# Patient Record
Sex: FEMALE | Race: WHITE | NOT HISPANIC OR LATINO | Employment: FULL TIME | URBAN - METROPOLITAN AREA
[De-identification: names, ages, dates, MRNs, and addresses within clinical notes are randomized per-mention and may not be internally consistent; named-entity substitution may affect disease eponyms.]

---

## 2017-05-16 ENCOUNTER — GENERIC CONVERSION - ENCOUNTER (OUTPATIENT)
Dept: OTHER | Facility: OTHER | Age: 22
End: 2017-05-16

## 2017-05-18 ENCOUNTER — ALLSCRIPTS OFFICE VISIT (OUTPATIENT)
Dept: OTHER | Facility: OTHER | Age: 22
End: 2017-05-18

## 2017-05-18 DIAGNOSIS — R53.83 OTHER FATIGUE: ICD-10-CM

## 2017-05-18 DIAGNOSIS — F41.9 ANXIETY DISORDER: ICD-10-CM

## 2017-05-18 DIAGNOSIS — F32.9 MAJOR DEPRESSIVE DISORDER, SINGLE EPISODE: ICD-10-CM

## 2017-06-16 ENCOUNTER — GENERIC CONVERSION - ENCOUNTER (OUTPATIENT)
Dept: OTHER | Facility: OTHER | Age: 22
End: 2017-06-16

## 2017-06-16 ENCOUNTER — ALLSCRIPTS OFFICE VISIT (OUTPATIENT)
Dept: OTHER | Facility: OTHER | Age: 22
End: 2017-06-16

## 2017-07-28 ENCOUNTER — APPOINTMENT (OUTPATIENT)
Dept: LAB | Facility: MEDICAL CENTER | Age: 22
End: 2017-07-28
Payer: COMMERCIAL

## 2017-07-28 DIAGNOSIS — R53.83 OTHER FATIGUE: ICD-10-CM

## 2017-07-28 DIAGNOSIS — F32.9 MAJOR DEPRESSIVE DISORDER, SINGLE EPISODE: ICD-10-CM

## 2017-07-28 DIAGNOSIS — F41.9 ANXIETY DISORDER: ICD-10-CM

## 2017-07-28 LAB
ALBUMIN SERPL BCP-MCNC: 3.9 G/DL (ref 3.5–5)
ALP SERPL-CCNC: 73 U/L (ref 46–116)
ALT SERPL W P-5'-P-CCNC: 16 U/L (ref 12–78)
ANION GAP SERPL CALCULATED.3IONS-SCNC: 8 MMOL/L (ref 4–13)
AST SERPL W P-5'-P-CCNC: 13 U/L (ref 5–45)
BASOPHILS # BLD AUTO: 0.01 THOUSANDS/ΜL (ref 0–0.1)
BASOPHILS NFR BLD AUTO: 0 % (ref 0–1)
BILIRUB SERPL-MCNC: 0.5 MG/DL (ref 0.2–1)
BUN SERPL-MCNC: 6 MG/DL (ref 5–25)
CALCIUM SERPL-MCNC: 9 MG/DL (ref 8.3–10.1)
CHLORIDE SERPL-SCNC: 107 MMOL/L (ref 100–108)
CO2 SERPL-SCNC: 25 MMOL/L (ref 21–32)
CREAT SERPL-MCNC: 0.66 MG/DL (ref 0.6–1.3)
EOSINOPHIL # BLD AUTO: 0 THOUSAND/ΜL (ref 0–0.61)
EOSINOPHIL NFR BLD AUTO: 0 % (ref 0–6)
ERYTHROCYTE [DISTWIDTH] IN BLOOD BY AUTOMATED COUNT: 14.6 % (ref 11.6–15.1)
GFR SERPL CREATININE-BSD FRML MDRD: 128 ML/MIN/1.73SQ M
GLUCOSE SERPL-MCNC: 83 MG/DL (ref 65–140)
HCT VFR BLD AUTO: 35.3 % (ref 34.8–46.1)
HGB BLD-MCNC: 11.9 G/DL (ref 11.5–15.4)
LYMPHOCYTES # BLD AUTO: 1.94 THOUSANDS/ΜL (ref 0.6–4.47)
LYMPHOCYTES NFR BLD AUTO: 21 % (ref 14–44)
MCH RBC QN AUTO: 27.1 PG (ref 26.8–34.3)
MCHC RBC AUTO-ENTMCNC: 33.7 G/DL (ref 31.4–37.4)
MCV RBC AUTO: 80 FL (ref 82–98)
MONOCYTES # BLD AUTO: 0.69 THOUSAND/ΜL (ref 0.17–1.22)
MONOCYTES NFR BLD AUTO: 8 % (ref 4–12)
NEUTROPHILS # BLD AUTO: 6.52 THOUSANDS/ΜL (ref 1.85–7.62)
NEUTS SEG NFR BLD AUTO: 71 % (ref 43–75)
NRBC BLD AUTO-RTO: 0 /100 WBCS
PLATELET # BLD AUTO: 268 THOUSANDS/UL (ref 149–390)
PMV BLD AUTO: 10.8 FL (ref 8.9–12.7)
POTASSIUM SERPL-SCNC: 3.8 MMOL/L (ref 3.5–5.3)
PROT SERPL-MCNC: 7.2 G/DL (ref 6.4–8.2)
RBC # BLD AUTO: 4.39 MILLION/UL (ref 3.81–5.12)
SODIUM SERPL-SCNC: 140 MMOL/L (ref 136–145)
TSH SERPL DL<=0.05 MIU/L-ACNC: 2.26 UIU/ML (ref 0.46–3.98)
WBC # BLD AUTO: 9.17 THOUSAND/UL (ref 4.31–10.16)

## 2017-07-28 PROCEDURE — 85025 COMPLETE CBC W/AUTO DIFF WBC: CPT

## 2017-07-28 PROCEDURE — 36415 COLL VENOUS BLD VENIPUNCTURE: CPT

## 2017-07-28 PROCEDURE — 80053 COMPREHEN METABOLIC PANEL: CPT

## 2017-07-28 PROCEDURE — 84443 ASSAY THYROID STIM HORMONE: CPT

## 2017-07-29 ENCOUNTER — GENERIC CONVERSION - ENCOUNTER (OUTPATIENT)
Dept: OTHER | Facility: OTHER | Age: 22
End: 2017-07-29

## 2018-01-09 NOTE — MISCELLANEOUS
Signatures   Electronically signed by : Kendy Lucero; Jun 16 2017  4:13PM EST                       (Author)

## 2018-01-10 NOTE — RESULT NOTES
Verified Results  (1) CBC/PLT/DIFF [id number] 09:25AM Camelia Staley    Order Number: [id number]     Test Name Result Flag Reference   WBC COUNT 9 17 Thousand/uL  4 31-10 16   RBC COUNT 4 39 Million/uL  3 81-5 12   HEMOGLOBIN 11 9 g/dL  11 5-15 4   HEMATOCRIT 35 3 %  34 8-46  1   MCV 80 fL L 82-98   MCH 27 1 pg  26 8-34 3   MCHC 33 7 g/dL  31 4-37 4   RDW 14 6 %  11 6-15 1   MPV 10 8 fL  8 9-12 7   PLATELET COUNT 109 Thousands/uL  149-390   nRBC AUTOMATED 0 /100 WBCs     NEUTROPHILS RELATIVE PERCENT 71 %  43-75   LYMPHOCYTES RELATIVE PERCENT 21 %  14-44   MONOCYTES RELATIVE PERCENT 8 %  4-12   EOSINOPHILS RELATIVE PERCENT 0 %  0-6   BASOPHILS RELATIVE PERCENT 0 %  0-1   NEUTROPHILS ABSOLUTE COUNT 6 52 Thousands/? ??L  1 85-7 62   LYMPHOCYTES ABSOLUTE COUNT 1 94 Thousands/? ??L  0 60-4 47   MONOCYTES ABSOLUTE COUNT 0 69 Thousand/? ??L  0 17-1 22   EOSINOPHILS ABSOLUTE COUNT 0 00 Thousand/? ??L  0 00-0 61   BASOPHILS ABSOLUTE COUNT 0 01 Thousands/? ??L  0 00-0 10     (1) COMPREHENSIVE METABOLIC PANEL [id number] 42:45OO Camelia Staley    Order Number: [id number]     Test Name Result Flag Reference   GLUCOSE,RANDM 83 mg/dL     If the patient is fasting, the ADA then defines impaired fasting glucose as > 100 mg/dL and diabetes as > or equal to 123 mg/dL     SODIUM 140 mmol/L  136-145   POTASSIUM 3 8 mmol/L  3 5-5 3   CHLORIDE 107 mmol/L  100-108   CARBON DIOXIDE 25 mmol/L  21-32   ANION GAP (CALC) 8 mmol/L  4-13   BLOOD UREA NITROGEN 6 mg/dL  5-25   CREATININE 0 66 mg/dL  0 60-1 30   Standardized to IDMS reference method   CALCIUM 9 0 mg/dL  8 3-10 1   BILI, TOTAL 0 50 mg/dL  0 20-1 00   ALK PHOSPHATAS 73 U/L     ALT (SGPT) 16 U/L  12-78   AST(SGOT) 13 U/L  5-45   ALBUMIN 3 9 g/dL  3 5-5 0   TOTAL PROTEIN 7 2 g/dL  6 4-8 2   eGFR 128 ml/min/1 73sq m     National Kidney Disease Education Program recommendations are as follows:  GFR calculation is accurate only with a steady state creatinine  Chronic Kidney disease less than 60 ml/min/1 73 sq  meters  Kidney failure less than 15 ml/min/1 73 sq  meters  (1) TSH WITH FT4 REFLEX [id number] 09:25AM Hammer Salt    Order Number: [id number]     Test Name Result Flag Reference   TSH 2 260 uIU/mL  0 463-3 980   Patients undergoing fluorescein dye angiography may retain small amounts of fluorescein in the body for 48-72 hours post procedure  Samples containing fluorescein can produce falsely depressed TSH values  If the patient had this procedure,a specimen should be resubmitted post fluorescein clearance            The recommended reference ranges for TSH during pregnancy are as follows:  First trimester 0 1 to 2 5 uIU/mL  Second trimester  0 2 to 3 0 uIU/mL  Third trimester 0 3 to 3 0 uIU/m

## 2018-01-13 NOTE — RESULT NOTES
Verified Results  (1) THROAT CULTURE (CULTURE, UPPER RESPIRATORY) 04Apr2016 03:58PM Jyl Case     Test Name Result Flag Reference   CLINICAL REPORT (Report)     Test:        Throat culture  Specimen Source:  Throat  Specimen Type:   Throat  Specimen Date:   4/4/2016 3:58 PM  Result Date:    4/7/2016 8:36 AM  Result Status:   Final result  Resulting Lab:   [facility] [address] [address]            Tel: [phone]                 CULTURE                                       ------------------                                   Beta hemolytic Strep Not Group A, B, C, F or G       Discussion/Summary   Mom notified of throat culture results positive for NON-group A strep  States she is feeling a bit better, with no further fever, but throat is still bothering her  Advise completing abx, as ordered, along with other symptomatic measures  Call early next week if still no better or worse  Mom verbalized understanding

## 2018-01-14 VITALS
SYSTOLIC BLOOD PRESSURE: 120 MMHG | TEMPERATURE: 97.6 F | WEIGHT: 190 LBS | HEART RATE: 78 BPM | RESPIRATION RATE: 18 BRPM | HEIGHT: 66 IN | OXYGEN SATURATION: 98 % | BODY MASS INDEX: 30.53 KG/M2 | DIASTOLIC BLOOD PRESSURE: 70 MMHG

## 2018-01-16 NOTE — MISCELLANEOUS
Provider Comments  Provider Comments:   Patient did not show up for confirmed, scheduled appointment  Nor did she call to cancel or reschedule  I left her a voicemail to call us back to reschedule   WL/92/OFF      Signatures   Electronically signed by : Arielle Copeland; Jun 16 2017  4:13PM EST                       (Author)

## 2018-09-20 ENCOUNTER — OFFICE VISIT (OUTPATIENT)
Dept: FAMILY MEDICINE CLINIC | Facility: OTHER | Age: 23
End: 2018-09-20
Payer: COMMERCIAL

## 2018-09-20 VITALS
HEART RATE: 75 BPM | OXYGEN SATURATION: 99 % | HEIGHT: 67 IN | BODY MASS INDEX: 32.39 KG/M2 | DIASTOLIC BLOOD PRESSURE: 80 MMHG | SYSTOLIC BLOOD PRESSURE: 108 MMHG | TEMPERATURE: 98.9 F | WEIGHT: 206.38 LBS

## 2018-09-20 DIAGNOSIS — R53.83 OTHER FATIGUE: ICD-10-CM

## 2018-09-20 DIAGNOSIS — F32.A ANXIETY AND DEPRESSION: Primary | ICD-10-CM

## 2018-09-20 DIAGNOSIS — F41.9 ANXIETY AND DEPRESSION: Primary | ICD-10-CM

## 2018-09-20 PROCEDURE — 99214 OFFICE O/P EST MOD 30 MIN: CPT | Performed by: NURSE PRACTITIONER

## 2018-09-20 PROCEDURE — 3008F BODY MASS INDEX DOCD: CPT | Performed by: NURSE PRACTITIONER

## 2018-09-20 RX ORDER — LORAZEPAM 0.5 MG/1
1-2 TABLET ORAL 2 TIMES DAILY PRN
COMMUNITY
Start: 2017-05-18 | End: 2018-09-20 | Stop reason: SDUPTHER

## 2018-09-20 RX ORDER — LORAZEPAM 0.5 MG/1
.5-1 TABLET ORAL 2 TIMES DAILY PRN
Qty: 30 TABLET | Refills: 0 | Status: SHIPPED | OUTPATIENT
Start: 2018-09-20 | End: 2018-10-22 | Stop reason: SDUPTHER

## 2018-09-20 NOTE — PROGRESS NOTES
PHQ-9 Depression Screening    PHQ-9:    Frequency of the following problems over the past two weeks:       Little interest or pleasure in doing things:  2 - more than half the days  Feeling down, depressed, or hopeless:  2 - more than half the days  Trouble falling or staying asleep, or sleeping too much:  3 - nearly every day  Feeling tired or having little energy:  3 - nearly every day  Poor appetite or overeatin - more than half the days  Feeling bad about yourself - or that you are a failure or have let yourself or your family down:  1 - several days  Trouble concentrating on things, such as reading the newspaper or watching television:  2 - more than half the days  Moving or speaking so slowly that other people could have noticed   Or the opposite - being so fidgety or restless that you have been moving around a lot more than usual:  0 - not at all  Thoughts that you would be better off dead, or of hurting yourself in some way:  0 - not at all  PHQ-2 Score:  4  PHQ-9 Score:  15

## 2018-09-20 NOTE — PROGRESS NOTES
Assessment/Plan:         Problem List Items Addressed This Visit     Anxiety and depression + fatigue  --She would like to restart meds which were previously helpful  Potential AE's of long-term benzo use discussed  Agreeable to take on occasional PRN basis only  Knows not to mix with alcohol  --Continue weekly counsellor visits, regular exercise, other stress relieving measures  --Ongoing fatigue  Working night shift may be a factor, but will check screening labs as precaution  --RTO 1 month for follow-up    Relevant Medications    LORazepam (ATIVAN) 0 5 mg tablet BID prn    sertraline (ZOLOFT) 50 mg tablet QD    Other Relevant Orders    CBC and differential    Comprehensive metabolic panel    Vitamin D 25 hydroxy    TSH, 3rd generation with Free T4 reflex    Ferritin            Subjective:      Patient ID: Gisselle Oneal is a 26 y o  female  Here as follow-up to anxiety/depression  Was previously taking Zoloft, with occasional use of Ativan, which was helpful  Stopped both meds in April because she was "doing better"  Since then, however her anxiety has increased and her depression has returned  Would like to restart medication  Still seeing her counsellor (Dr [facility]) weekly, and he has recommended restarting meds also  Low motivation, energy level  Tired much of the time  Mild panic attacks a couple times a week, but not debilitating  Appetite decreased  Occiosional upset stomach  No V/C/D  No manic symptoms  Continues to live at home  Parents fight much of the time which is a big contributor to her stress level  Hopes to move out at some point in the future, but hasn't yet found a suitable roommate  Has 1-2 somewhat supportive friends  Working full time nights at [employer]  Likes her job for the most part, not too stressful  Goes to gym after work which is a stress reliever  Thinking eventually about going back to school for business, human resources      No suicidal ideation, persistent hopelessness, despair  No alcohol, drug use, smoking  1-2 cups of coffee per day  Not currently in a relationship  Periods remain irregular, but not too heavy when she gets them  Saw GYN in January  Started on OCP, but doesn't seem to be helping  Has f/u scheduled in a few months  The following portions of the patient's history were reviewed and updated as appropriate: allergies, current medications, past family history, past medical history, past social history, past surgical history and problem list     Review of Systems   Constitutional: Positive for fatigue  Negative for fever and unexpected weight change  Cardiovascular: Negative for chest pain  Gastrointestinal:        Per HPI   Neurological:        Per HPI   Psychiatric/Behavioral:        Per HPI         Objective:      /80 (BP Location: Right arm, Patient Position: Sitting, Cuff Size: Large)   Pulse 75   Temp 98 9 °F (37 2 °C) (Tympanic)   Ht 5' 6 75" (1 695 m)   Wt 93 6 kg (206 lb 6 oz)   SpO2 99%   BMI 32 57 kg/m²          Physical Exam   Constitutional: She is oriented to person, place, and time  She appears well-developed and well-nourished  Cardiovascular: Normal rate and normal heart sounds  Pulmonary/Chest: Effort normal and breath sounds normal    Musculoskeletal: She exhibits no edema  Neurological: She is alert and oriented to person, place, and time  She has normal reflexes  Psychiatric: She has a normal mood and affect   Her behavior is normal  Judgment and thought content normal

## 2018-10-22 ENCOUNTER — OFFICE VISIT (OUTPATIENT)
Dept: FAMILY MEDICINE CLINIC | Facility: OTHER | Age: 23
End: 2018-10-22
Payer: COMMERCIAL

## 2018-10-22 VITALS
DIASTOLIC BLOOD PRESSURE: 82 MMHG | HEIGHT: 66 IN | OXYGEN SATURATION: 99 % | WEIGHT: 205.9 LBS | HEART RATE: 72 BPM | SYSTOLIC BLOOD PRESSURE: 116 MMHG | BODY MASS INDEX: 33.09 KG/M2 | TEMPERATURE: 98.1 F

## 2018-10-22 DIAGNOSIS — F41.9 ANXIETY AND DEPRESSION: Primary | ICD-10-CM

## 2018-10-22 DIAGNOSIS — R53.83 OTHER FATIGUE: ICD-10-CM

## 2018-10-22 DIAGNOSIS — F32.A ANXIETY AND DEPRESSION: Primary | ICD-10-CM

## 2018-10-22 PROCEDURE — 99214 OFFICE O/P EST MOD 30 MIN: CPT | Performed by: NURSE PRACTITIONER

## 2018-10-22 RX ORDER — SERTRALINE HYDROCHLORIDE 100 MG/1
100 TABLET, FILM COATED ORAL DAILY
Qty: 30 TABLET | Refills: 1 | Status: SHIPPED | OUTPATIENT
Start: 2018-10-22 | End: 2018-11-26 | Stop reason: SDUPTHER

## 2018-10-22 RX ORDER — LORAZEPAM 0.5 MG/1
.5-1 TABLET ORAL 2 TIMES DAILY PRN
Qty: 30 TABLET | Refills: 0 | Status: SHIPPED | OUTPATIENT
Start: 2018-10-22 | End: 2022-08-08

## 2018-10-22 NOTE — PROGRESS NOTES
Assessment/Plan:           Problem List Items Addressed This Visit     Anxiety and depression   --She has yet to feel benefit from Zoloft  Will try increasing dose 50 mg-->100 mg     --Continued regular exercise, counsellor visits, good sleep hygiene encouraged  --Ativan used sparingly    --RTO 1 month for f/u    Relevant Medications    sertraline (ZOLOFT) 100 mg tablet    LORazepam (ATIVAN) 0 5 mg tablet    Fatigue  --Reminded to get labs done  Slip given previously          Declines flu shot  RTO 1 month    Subjective:      Patient ID: Carol Noble is a 26 y o  female  Here for follow-up to moods  Restarted on Zoloft a month ago  Tolerating without AE's, but has yet to feel any different taking it  Still feels down/depressed, anxious, with low motivation and energy level  Appetite remains somewhat decreased  Panic attack last week  Ativan helps when she takes it  Things going about the same at work and home  Continues to go to gym and see counsellor which helps  No suicidal ideation  Adequate support system  No alcohol or drug use  Hasn't gotten blood work done yet  Right shoulder aches at times  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)  The following portions of the patient's history were reviewed and updated as appropriate: current medications, past family history, past medical history, past social history, past surgical history and problem list     Review of Systems   Constitutional: Positive for fatigue  HENT: Negative for sore throat  Respiratory: Negative for shortness of breath  Gastrointestinal: Negative for abdominal pain, constipation and diarrhea  Neurological: Positive for headaches     Psychiatric/Behavioral:        Per HPI         Objective:      /82 (BP Location: Left arm, Patient Position: Sitting, Cuff Size: Adult)   Pulse 72   Temp 98 1 °F (36 7 °C) (Tympanic)   Ht 5' 6" (1 676 m)   Wt 93 4 kg (205 lb 14 4 oz)   SpO2 99%   BMI 33 23 kg/m²          Physical Exam   Constitutional: She is oriented to person, place, and time  She appears well-developed  HENT:   Right Ear: External ear normal    Left Ear: External ear normal    Mouth/Throat: Oropharynx is clear and moist    Cardiovascular: Normal rate and normal heart sounds  Pulmonary/Chest: Effort normal and breath sounds normal    Neurological: She is alert and oriented to person, place, and time  Psychiatric: She has a normal mood and affect   Her behavior is normal  Judgment and thought content normal

## 2018-11-26 ENCOUNTER — OFFICE VISIT (OUTPATIENT)
Dept: FAMILY MEDICINE CLINIC | Facility: OTHER | Age: 23
End: 2018-11-26
Payer: COMMERCIAL

## 2018-11-26 VITALS
TEMPERATURE: 97.9 F | BODY MASS INDEX: 33.14 KG/M2 | SYSTOLIC BLOOD PRESSURE: 120 MMHG | OXYGEN SATURATION: 98 % | DIASTOLIC BLOOD PRESSURE: 82 MMHG | HEART RATE: 72 BPM | HEIGHT: 66 IN | WEIGHT: 206.2 LBS

## 2018-11-26 DIAGNOSIS — F41.9 ANXIETY AND DEPRESSION: Primary | ICD-10-CM

## 2018-11-26 DIAGNOSIS — F32.A ANXIETY AND DEPRESSION: Primary | ICD-10-CM

## 2018-11-26 PROCEDURE — 3008F BODY MASS INDEX DOCD: CPT | Performed by: NURSE PRACTITIONER

## 2018-11-26 PROCEDURE — 99214 OFFICE O/P EST MOD 30 MIN: CPT | Performed by: NURSE PRACTITIONER

## 2018-11-26 PROCEDURE — 1036F TOBACCO NON-USER: CPT | Performed by: NURSE PRACTITIONER

## 2018-11-26 RX ORDER — SERTRALINE HYDROCHLORIDE 100 MG/1
100 TABLET, FILM COATED ORAL DAILY
Qty: 30 TABLET | Refills: 3 | Status: SHIPPED | OUTPATIENT
Start: 2018-11-26 | End: 2022-08-08

## 2018-11-26 NOTE — PROGRESS NOTES
Assessment/Plan:         Problem List Items Addressed This Visit     Anxiety and depression   --She feels like increased dose of Zoloft has been helpful  Wishes to leave unchanged at this time  --Uses Ativan sparingly  --Continued counsellor visits, regular exercise encouraged  Discussed spiritual considerations  --Slip for screening labs (including TSH) reprinted  Relevant Medications    sertraline (ZOLOFT) 100 mg tablet QD          Declines flu shot  RTO 3 months      Subjective:      Patient ID: Bernadette Otero is a 26 y o  female  Here as follow-up to depression, anxiety  See previous two office notes for details  Feels like increased dose of Zoloft has been helping  Feels somewhat less depressed, anxious overall  No AE's  No panic attacks since last visit  Has not had to use Ativan every day  Energy level remains low, but she attributes this in part, to continuing to work nightshift  Things going OK at home, other than the fact that her brother relapsed again last week  They are considering another inpatient rehab  Continues to go to gym regularly  Continues to see counsellor (Dr Mamta King)  Feels like she is coping OK overall, with adequate support system  No definite spiritual beliefs  No suicidal ideation  No alcohol or drug use  PHQ-9 score = 13    Lost slip for screening blood work  The following portions of the patient's history were reviewed and updated as appropriate: current medications, past family history, past medical history, past social history, past surgical history and problem list     Review of Systems   Constitutional: Positive for fatigue  Cardiovascular: Negative for chest pain  Gastrointestinal: Negative for abdominal pain, diarrhea, nausea and vomiting  Neurological: Negative for dizziness and headaches     Psychiatric/Behavioral:        Per HPI         Objective:      /82 (BP Location: Left arm, Patient Position: Sitting, Cuff Size: Adult)   Pulse 72   Temp 97 9 °F (36 6 °C) (Tympanic)   Ht 5' 6" (1 676 m)   Wt 93 5 kg (206 lb 3 2 oz)   SpO2 98%   BMI 33 28 kg/m²          Physical Exam   Constitutional: She is oriented to person, place, and time  She appears well-developed  Cardiovascular: Normal rate and regular rhythm  Pulmonary/Chest: Effort normal and breath sounds normal    Neurological: She is alert and oriented to person, place, and time  Psychiatric: She has a normal mood and affect   Her behavior is normal  Judgment and thought content normal

## 2018-12-15 ENCOUNTER — APPOINTMENT (EMERGENCY)
Dept: RADIOLOGY | Facility: HOSPITAL | Age: 23
End: 2018-12-15
Payer: OTHER MISCELLANEOUS

## 2018-12-15 ENCOUNTER — HOSPITAL ENCOUNTER (EMERGENCY)
Facility: HOSPITAL | Age: 23
Discharge: HOME | End: 2018-12-15
Attending: EMERGENCY MEDICINE | Admitting: EMERGENCY MEDICINE
Payer: OTHER MISCELLANEOUS

## 2018-12-15 VITALS
HEART RATE: 85 BPM | SYSTOLIC BLOOD PRESSURE: 123 MMHG | BODY MASS INDEX: 34.27 KG/M2 | RESPIRATION RATE: 16 BRPM | TEMPERATURE: 98.8 F | OXYGEN SATURATION: 97 % | DIASTOLIC BLOOD PRESSURE: 62 MMHG | WEIGHT: 212.3 LBS
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
## 2018-12-15 PROCEDURE — 73080 X-RAY EXAM OF ELBOW: CPT

## 2018-12-15 PROCEDURE — 73030 X-RAY EXAM OF SHOULDER: CPT

## 2018-12-15 PROCEDURE — 99283 EMERGENCY DEPT VISIT LOW MDM: CPT

## 2018-12-15 RX ORDER — NAPROXEN 250 MG/1
500 TABLET ORAL ONCE
Status: COMPLETED | OUTPATIENT
Start: 2018-12-15 | End: 2018-12-15

## 2018-12-15 RX ORDER — NAPROXEN 500 MG/1
500 TABLET ORAL 2 TIMES DAILY WITH MEALS
Qty: 14 TABLET | Refills: 0 | Status: SHIPPED | OUTPATIENT
Start: 2018-12-15 | End: 2020-02-18

## 2018-12-15 RX ORDER — ACETAMINOPHEN 325 MG/1
650 TABLET ORAL ONCE
Status: COMPLETED | OUTPATIENT
Start: 2018-12-15 | End: 2018-12-15

## 2018-12-15 RX ADMIN — NAPROXEN 500 MG: 250 TABLET ORAL at 22:27

## 2018-12-15 RX ADMIN — ACETAMINOPHEN 650 MG: 325 TABLET, FILM COATED ORAL at 21:18

## 2018-12-16 NOTE — ED PROVIDER NOTES
History  Chief Complaint   Patient presents with    Arm Pain     Pt presents to ED due to c/o RIGHT arm pain "from shoulder to fingertips" since last night  Resolved for a period of time, then returned  History provided by:  Patient  Arm Pain   Location:  Right shoulder and right elbow  Severity:  Moderate  Onset quality:  Gradual  Duration:  2 days  Timing:  Intermittent  Progression:  Waxing and waning  Chronicity:  New  Context:  Patient presents with right shoulder and right elbow, overuse injury with new machine at work  Relieved by:  Nothing tried  Worsened by: Activity  Ineffective treatments:  None tried  Associated symptoms: no abdominal pain, no chest pain, no congestion, no cough, no diarrhea, no ear pain, no fatigue, no fever, no headaches, no loss of consciousness, no myalgias, no nausea, no rash, no rhinorrhea, no shortness of breath, no sore throat, no vomiting and no wheezing        Prior to Admission Medications   Prescriptions Last Dose Informant Patient Reported? Taking? LORazepam (ATIVAN) 0 5 mg tablet   No No   Sig: Take 1-2 tablets (0 5-1 mg total) by mouth 2 (two) times a day as needed for anxiety   sertraline (ZOLOFT) 100 mg tablet   No No   Sig: Take 1 tablet (100 mg total) by mouth daily      Facility-Administered Medications: None       Past Medical History:   Diagnosis Date    Anxiety     Last assessed 5/18/2017     Depression     Last assessed 5/18/2017     Fatigue     Last assessed 5/18/2017        History reviewed  No pertinent surgical history  History reviewed  No pertinent family history  I have reviewed and agree with the history as documented  Social History   Substance Use Topics    Smoking status: Never Smoker    Smokeless tobacco: Never Used    Alcohol use No        Review of Systems   Constitutional: Positive for activity change  Negative for appetite change, chills, fatigue and fever     HENT: Negative for congestion, ear pain, rhinorrhea and sore throat  Respiratory: Negative for cough, shortness of breath and wheezing  Cardiovascular: Negative for chest pain  Gastrointestinal: Negative for abdominal pain, diarrhea, nausea and vomiting  Genitourinary: Negative for dysuria, frequency and urgency  Musculoskeletal: Positive for arthralgias  Negative for joint swelling, myalgias, neck pain and neck stiffness  Skin: Negative for rash  Neurological: Negative for loss of consciousness and headaches  Psychiatric/Behavioral: Negative for confusion  All other systems reviewed and are negative  Physical Exam  Physical Exam   Constitutional: She is oriented to person, place, and time  She appears well-developed and well-nourished  No distress  HENT:   Head: Normocephalic and atraumatic  Right Ear: External ear normal    Left Ear: External ear normal    Nose: Nose normal    Eyes: Conjunctivae are normal  Right eye exhibits no discharge  Left eye exhibits no discharge  Neck: Neck supple  No JVD present  Pulmonary/Chest: Effort normal  No stridor  Musculoskeletal:   Examination of the cervical spine-it is atraumatic upon inspection  There is no bony tenderness  There is no para vertebral spasm palpated  There is good range of motion in all planes  Reflexes are +2 and symmetrical   Inspection of the right shoulder-it is atraumatic upon inspection  There is generalized tenderness palpated over the deltoid muscle  There is a positive impingement sign  There is good range of motion in all planes  Inspection of the right elbow-it is atraumatic upon inspection  There is no bony tenderness  There is good range of motion in all planes  Motor and sensory are intact to the median, ulnar, axillary, and radial distribution of the right upper extremity  Lymphadenopathy:     She has no cervical adenopathy  Neurological: She is alert and oriented to person, place, and time  Skin: Skin is warm  Capillary refill takes less than 2 seconds  She is not diaphoretic  Psychiatric: She has a normal mood and affect  Her behavior is normal  Judgment and thought content normal    Nursing note and vitals reviewed  Vital Signs  ED Triage Vitals [12/15/18 2044]   Temperature Pulse Respirations Blood Pressure SpO2   98 8 °F (37 1 °C) 85 16 123/62 97 %      Temp Source Heart Rate Source Patient Position - Orthostatic VS BP Location FiO2 (%)   Oral Monitor Sitting Right arm --      Pain Score       7           Vitals:    12/15/18 2044   BP: 123/62   Pulse: 85   Patient Position - Orthostatic VS: Sitting       Visual Acuity      ED Medications  Medications   acetaminophen (TYLENOL) tablet 650 mg (650 mg Oral Given 12/15/18 2118)   naproxen (NAPROSYN) tablet 500 mg (500 mg Oral Given 12/15/18 2227)       Diagnostic Studies  Results Reviewed     None                 XR shoulder 2+ views RIGHT   ED Interpretation by Andrey Curiel PA-C (12/15 2207)   No acute disease      Final Result by Carollee Olszewski, DO (12/15 2218)      No acute osseous abnormality  Grossly unremarkable exam             Workstation performed: [id number]         XR elbow 3+ vw RIGHT   ED Interpretation by Andrey Curiel PA-C (12/15 2207)   No acute abnormality      Final Result by Carollee Olszewski, DO (12/15 2216)      No acute osseous abnormality    Grossly unremarkable exam             Workstation performed: [id number]                    Procedures  Procedures       Phone Contacts  ED Phone Contact    ED Course                               MDM  Number of Diagnoses or Management Options  Overuse injury: new and requires workup     Amount and/or Complexity of Data Reviewed  Tests in the radiology section of CPT®: ordered and reviewed  Tests in the medicine section of CPT®: ordered and reviewed    Risk of Complications, Morbidity, and/or Mortality  Presenting problems: moderate  Diagnostic procedures: moderate  Management options: moderate  General comments: Patient presents emergency room with an onset of the right shoulder and right elbow pain  She states she has been using any machine at work  She states that has occurred since fall onset  She was seen and examined  X-rays of the right shoulder and right elbow were normal   She demonstrated signs of impingement syndrome with her right shoulder  She had good range of motion of her right elbow with no palpable tenderness  She was diagnosed with an overuse injury  She was given a prescription for Naprosyn to take twice a day with food for the next 7 days as needed for pain  She was given a referral to Sports Medicine for repeat exam     Patient Progress  Patient progress: stable    CritCare Time    Disposition  Final diagnoses:   Overuse injury - Right shoulder and right elbow     Time reflects when diagnosis was documented in both MDM as applicable and the Disposition within this note     Time User Action Codes Description Comment    12/15/2018 10:08 PM Rohan Henderson  8XXA] Overuse injury     12/15/2018 10:08 PM Demar Gay Modify [T14  8XXA] Overuse injury Right shoulder and right elbow      ED Disposition     ED Disposition Condition Comment    Discharge  Octavia Reece discharge to home/self care      Condition at discharge: Good        Follow-up Information     Follow up With Specialties Details Why [address] [facility], Orthopedic Surgery Schedule an appointment as soon as possible for a visit in 2 days  [address]  [phone]            Discharge Medication List as of 12/15/2018 10:12 PM      START taking these medications    Details   naproxen (NAPROSYN) 500 mg tablet Take 1 tablet (500 mg total) by mouth 2 (two) times a day with meals for 14 doses, Starting Sat 12/15/2018, Until Sat 12/22/2018, Normal         CONTINUE these medications which have NOT CHANGED    Details   LORazepam (ATIVAN) 0 5 mg tablet Take 1-2 tablets (0 5-1 mg total) by mouth 2 (two) times a day as needed for anxiety, Starting Mon 10/22/2018, Normal      sertraline (ZOLOFT) 100 mg tablet Take 1 tablet (100 mg total) by mouth daily, Starting Mon 11/26/2018, Normal           No discharge procedures on file      ED Provider  Electronically Signed by           Becky Hilario PA-C  12/15/18 5301

## 2018-12-18 ENCOUNTER — APPOINTMENT (OUTPATIENT)
Dept: URGENT CARE | Age: 23
End: 2018-12-18
Payer: OTHER MISCELLANEOUS

## 2018-12-18 PROCEDURE — 99214 OFFICE O/P EST MOD 30 MIN: CPT | Performed by: PREVENTIVE MEDICINE

## 2018-12-28 ENCOUNTER — APPOINTMENT (OUTPATIENT)
Dept: URGENT CARE | Age: 23
End: 2018-12-28
Payer: OTHER MISCELLANEOUS

## 2018-12-28 PROCEDURE — 99213 OFFICE O/P EST LOW 20 MIN: CPT | Performed by: PREVENTIVE MEDICINE

## 2019-01-11 ENCOUNTER — APPOINTMENT (OUTPATIENT)
Dept: URGENT CARE | Age: 24
End: 2019-01-11

## 2019-08-12 ENCOUNTER — OFFICE VISIT (OUTPATIENT)
Dept: FAMILY MEDICINE CLINIC | Facility: OTHER | Age: 24
End: 2019-08-12
Payer: COMMERCIAL

## 2019-08-12 VITALS
SYSTOLIC BLOOD PRESSURE: 98 MMHG | HEIGHT: 66 IN | HEART RATE: 100 BPM | WEIGHT: 217 LBS | OXYGEN SATURATION: 98 % | BODY MASS INDEX: 34.87 KG/M2 | TEMPERATURE: 99.1 F | DIASTOLIC BLOOD PRESSURE: 64 MMHG

## 2019-08-12 DIAGNOSIS — F41.9 ANXIETY AND DEPRESSION: Primary | ICD-10-CM

## 2019-08-12 DIAGNOSIS — F32.A ANXIETY AND DEPRESSION: Primary | ICD-10-CM

## 2019-08-12 DIAGNOSIS — R53.83 OTHER FATIGUE: ICD-10-CM

## 2019-08-12 DIAGNOSIS — F30.10 MANIC BEHAVIOR (HCC): ICD-10-CM

## 2019-08-12 PROCEDURE — 1036F TOBACCO NON-USER: CPT | Performed by: NURSE PRACTITIONER

## 2019-08-12 PROCEDURE — 99214 OFFICE O/P EST MOD 30 MIN: CPT | Performed by: NURSE PRACTITIONER

## 2019-08-12 PROCEDURE — 3008F BODY MASS INDEX DOCD: CPT | Performed by: NURSE PRACTITIONER

## 2019-08-12 RX ORDER — ARIPIPRAZOLE 5 MG/1
5 TABLET ORAL DAILY
Qty: 30 TABLET | Refills: 1 | Status: SHIPPED | OUTPATIENT
Start: 2019-08-12 | End: 2020-02-18

## 2019-08-12 NOTE — PROGRESS NOTES
PHQ-9 Depression Screening    PHQ-9:    Frequency of the following problems over the past two weeks:       Little interest or pleasure in doing things:  2 - more than half the days  Feeling down, depressed, or hopeless:  3 - nearly every day  Trouble falling or staying asleep, or sleeping too much:  3 - nearly every day  Feeling tired or having little energy:  2 - more than half the days  Poor appetite or overeatin - several days  Feeling bad about yourself - or that you are a failure or have let yourself or your family down:  1 - several days  Trouble concentrating on things, such as reading the newspaper or watching television:  0 - not at all  Moving or speaking so slowly that other people could have noticed   Or the opposite - being so fidgety or restless that you have been moving around a lot more than usual:  0 - not at all  Thoughts that you would be better off dead, or of hurting yourself in some way:  0 - not at all  PHQ-2 Score:  5  PHQ-9 Score:  12

## 2019-08-12 NOTE — PROGRESS NOTES
Assessment/Plan:         Problem List Items Addressed This Visit     Anxiety and depression   Fatigue   Manic behavior ([address] )  --Possible bipolar, but would benefit from psychiatry evaluation for formal diagnosis  Referral information given, will try and expedite  In the interim, will trial low dose mood stabilizer along with increasing dose of Zoloft 100-->150 mg  Potential AE's discussed  --Continue weekly counsellor visits  --Slip for screening labs (including TSH, etc) ordered previously reprinted, advised to get done  --Nightshift/sleep schedule likely a contributing factor  May benefit from changing work hours  --RTO 2-3 weeks  Call for med issues in the interim  ER for worsening moods/SI/HI       Relevant Medications    sertraline (ZOLOFT) 50 mg tablet: Take with 100 mg tablet (150 mg total)    ARIPiprazole (ABILIFY) 5 mg tablet QD    Other Relevant Orders    Ambulatory referral to Psychiatry          RTO 2-3 weeks    Subjective:      Patient ID: Marsha Salazar is a 27 y o  female  Here with her mom because of ongoing mood issues  Mom thinks she may be bipolar  Last seen 11/2018  Depression no better since then  Feels like Zoloft is no longer working  Down/depressed, much of the time, with low motivation, low energy level  Some anhedonia  Ongoing periods of anxiety, occasional panic attacks  Ativan helpful when she takes it--uses sparingly  Both mom and patient note frequent (most days) manic tendencies marked by pressured speech, unable to sleep, increased energy, compulsive shopping, etc    Denies suicidal ideation/intent  Appetite fair  5 pound weight gain since previous visit  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[employer])  Has moved out of the house and is now living on her own  Adequate support system (mother, friends)  Continues to see counsellor (Dr Timbo Gomes) weekly  Helpful    Has never seen psychiatrist      PHQ-9 = 12 at today's visit (15 previously)    Previously ordered blood work (including TSH and vitamin D level) not done  Lost lab slip  Normal periods  The following portions of the patient's history were reviewed and updated as appropriate: current medications, past family history, past medical history, past social history, past surgical history and problem list     Review of Systems   Constitutional: Positive for fatigue and unexpected weight change  Gastrointestinal: Negative for vomiting  Psychiatric/Behavioral:        Per HPI         Objective:      BP 98/64 (BP Location: Left arm, Patient Position: Sitting, Cuff Size: Large)   Pulse 100   Temp 99 1 °F (37 3 °C) (Tympanic)   Ht 5' 6" (1 676 m)   Wt 98 4 kg (217 lb)   SpO2 98%   BMI 35 02 kg/m²          Physical Exam   Psychiatric: Her behavior is normal  Judgment and thought content normal    Calm, relaxed, somewhat depressed affect  Weepy at times

## 2019-12-12 ENCOUNTER — OFFICE VISIT (OUTPATIENT)
Dept: URGENT CARE | Facility: MEDICAL CENTER | Age: 24
End: 2019-12-12
Payer: COMMERCIAL

## 2019-12-12 VITALS
BODY MASS INDEX: 31.82 KG/M2 | HEIGHT: 66 IN | SYSTOLIC BLOOD PRESSURE: 128 MMHG | RESPIRATION RATE: 20 BRPM | OXYGEN SATURATION: 99 % | WEIGHT: 198 LBS | DIASTOLIC BLOOD PRESSURE: 86 MMHG | HEART RATE: 75 BPM | TEMPERATURE: 97.7 F

## 2019-12-12 DIAGNOSIS — J02.9 SORE THROAT: Primary | ICD-10-CM

## 2019-12-12 DIAGNOSIS — J02.9 ACUTE PHARYNGITIS, UNSPECIFIED ETIOLOGY: ICD-10-CM

## 2019-12-12 LAB — S PYO AG THROAT QL: NEGATIVE

## 2019-12-12 PROCEDURE — S9083 URGENT CARE CENTER GLOBAL: HCPCS | Performed by: PHYSICIAN ASSISTANT

## 2019-12-12 PROCEDURE — 87880 STREP A ASSAY W/OPTIC: CPT | Performed by: PHYSICIAN ASSISTANT

## 2019-12-12 PROCEDURE — G0382 LEV 3 HOSP TYPE B ED VISIT: HCPCS | Performed by: PHYSICIAN ASSISTANT

## 2019-12-12 NOTE — PROGRESS NOTES
[facility]        NAME: Fercho Carney is a 27 y o  female  : 1995    MRN: [id number]  DATE: 2019  TIME: 11:16 AM    Assessment and Plan   Sore throat [J02 9]  1  Sore throat  POCT rapid strepA   2  Acute pharyngitis, unspecified etiology           Patient Instructions     1  Motrin as needed for throat pain  2  Increase fluids  3  Follow up with PCP in 3-5 days if symptoms persist      Chief Complaint     Chief Complaint   Patient presents with    Sore Throat     x 1 day         History of Present Illness       Domitila Bailey is a 28-year-old female that presents with a 1 day history of acute onset sore throat  Patient has had a headache but denies any fever, nasal discharge cough or congestion since the onset of her symptoms  Review of Systems   Review of Systems   Constitutional: Negative  HENT: Positive for sore throat  Negative for rhinorrhea  Respiratory: Negative for cough  Gastrointestinal: Negative            Current Medications       Current Outpatient Medications:     ARIPiprazole (ABILIFY) 5 mg tablet, Take 1 tablet (5 mg total) by mouth daily, Disp: 30 tablet, Rfl: 1    LORazepam (ATIVAN) 0 5 mg tablet, Take 1-2 tablets (0 5-1 mg total) by mouth 2 (two) times a day as needed for anxiety, Disp: 30 tablet, Rfl: 0    sertraline (ZOLOFT) 100 mg tablet, Take 1 tablet (100 mg total) by mouth daily, Disp: 30 tablet, Rfl: 3    sertraline (ZOLOFT) 50 mg tablet, Take 1 tablet daily along with 100 mg tablet (150 mg total daily dose), Disp: 30 tablet, Rfl: 3    naproxen (NAPROSYN) 500 mg tablet, Take 1 tablet (500 mg total) by mouth 2 (two) times a day with meals for 14 doses, Disp: 14 tablet, Rfl: 0    Current Allergies     Allergies as of 2019 - Reviewed 2019   Allergen Reaction Noted    Penicillins Rash 2017            The following portions of the patient's history were reviewed and updated as appropriate: allergies, current medications, past family history, past medical history, past social history, past surgical history and problem list      Past Medical History:   Diagnosis Date    Anxiety     Last assessed 5/18/2017     Depression     Last assessed 5/18/2017     Fatigue     Last assessed 5/18/2017        History reviewed  No pertinent surgical history  History reviewed  No pertinent family history  Medications have been verified  Objective   /86   Pulse 75   Temp 97 7 °F (36 5 °C)   Resp 20   Ht 5' 6" (1 676 m)   Wt 89 8 kg (198 lb)   SpO2 99%   BMI 31 96 kg/m²        Physical Exam     Physical Exam   Constitutional: She appears well-developed and well-nourished  No distress  HENT:   Head: Normocephalic and atraumatic  Right Ear: Tympanic membrane and ear canal normal    Left Ear: Tympanic membrane and ear canal normal    Nose: Nose normal    Mouth/Throat: Posterior oropharyngeal erythema present  No posterior oropharyngeal edema  Cardiovascular: Normal rate, regular rhythm and normal heart sounds  No murmur heard    Pulmonary/Chest: Effort normal and breath sounds normal

## 2019-12-12 NOTE — LETTER
December 12, 2019     Patient: Ollie Mclaughlin   YOB: 1995   Date of Visit: 12/12/2019       To Whom It May Concern: It is my medical opinion that Ollie Mclaughlin may return to work on 12/13/2019  If you have any questions or concerns, please don't hesitate to call  Sincerely,        Chana Mccord PA-C    CC: Yadira Hernandez

## 2020-01-28 ENCOUNTER — OFFICE VISIT (OUTPATIENT)
Dept: OBGYN CLINIC | Facility: CLINIC | Age: 25
End: 2020-01-28
Payer: COMMERCIAL

## 2020-01-28 VITALS — BODY MASS INDEX: 35.35 KG/M2 | HEART RATE: 90 BPM | WEIGHT: 219 LBS

## 2020-01-28 DIAGNOSIS — M22.2X1 PATELLOFEMORAL DISORDER OF RIGHT KNEE: Primary | ICD-10-CM

## 2020-01-28 PROCEDURE — 99203 OFFICE O/P NEW LOW 30 MIN: CPT | Performed by: ORTHOPAEDIC SURGERY

## 2020-01-28 RX ORDER — MELOXICAM 15 MG/1
15 TABLET ORAL DAILY
Qty: 30 TABLET | Refills: 0 | Status: SHIPPED | OUTPATIENT
Start: 2020-01-28 | End: 2022-08-08

## 2020-01-28 NOTE — PROGRESS NOTES
Patient Name:  Mike Barker  MRN:  [id number]    Assessment & Plan     Right knee patellofemoral dysfunction  1  Referral to physical therapy for home exercise program  2  Prescription for meloxicam  3  Follow-up in six weeks for repeat evaluation  Briefly discussed obtaining MRI if pain persists    Chief Complaint     Right knee pain    History of the Present Illness     Mike Barker is a 27 y o  female who reports to the office today for evaluation of her right knee  She notes evolving right knee pain over the past few weeks  She denies any injury or trauma  She states the pain is localized to the anterior aspect of the knee and underneath the kneecap  Pain is worse with walking up and down steps  She describes the pain as a pinching type pain  She denies any weakness or instability  She has been taking ibuprofen without relief  She denies any significant swelling or stiffness  No numbness or tingling  No fevers or chills  Physical Exam     Pulse 90   Wt 99 3 kg (219 lb)   BMI 35 35 kg/m²     Right knee:  No gross deformity  Skin intact  No erythema ecchymosis or swelling  No effusion  Tenderness to palpation medial border of the patella  No joint line tenderness  Full range of motion without discomfort  Stable to varus and valgus stress  Stable Lachman test   Negative Vonnie's test   Positive patellar apprehension test   Sensation intact right lower extremity  Skin warm and well perfused  Eyes: No scleral icterus  Neck: Supple  Lungs: Normal respiratory effort  Cardiovascular: Capillary refill is less than 2 seconds  Skin: Intact without erythema  Neurologic: Sensation intact to light touch  Psychiatric: Mood and affect are appropriate  Past Medical History:   Diagnosis Date    Anxiety     Last assessed 5/18/2017     Depression     Last assessed 5/18/2017     Fatigue     Last assessed 5/18/2017        History reviewed   No pertinent surgical history  Allergies   Allergen Reactions    Penicillins Rash     ALLERGIC TO ALL THE 'CILLINS       Current Outpatient Medications on File Prior to Visit   Medication Sig Dispense Refill    ARIPiprazole (ABILIFY) 5 mg tablet Take 1 tablet (5 mg total) by mouth daily (Patient not taking: Reported on 1/28/2020) 30 tablet 1    LORazepam (ATIVAN) 0 5 mg tablet Take 1-2 tablets (0 5-1 mg total) by mouth 2 (two) times a day as needed for anxiety 30 tablet 0    naproxen (NAPROSYN) 500 mg tablet Take 1 tablet (500 mg total) by mouth 2 (two) times a day with meals for 14 doses 14 tablet 0    sertraline (ZOLOFT) 100 mg tablet Take 1 tablet (100 mg total) by mouth daily 30 tablet 3    sertraline (ZOLOFT) 50 mg tablet Take 1 tablet daily along with 100 mg tablet (150 mg total daily dose) (Patient not taking: Reported on 1/28/2020) 30 tablet 3     No current facility-administered medications on file prior to visit  Social History     Tobacco Use    Smoking status: Never Smoker    Smokeless tobacco: Never Used   Substance Use Topics    Alcohol use: No    Drug use: No       History reviewed  No pertinent family history  Review of Systems     As stated in the HPI  All other systems were reviewed and are negative        Scribe Attestation    I,:   Hari Tomlin PA-C am acting as a scribe while in the presence of the attending physician :        I,:   Maria D Moncada MD personally performed the services described in this documentation    as scribed in my presence :

## 2020-02-04 NOTE — PROGRESS NOTES
PT Evaluation     Today's date: 2020  Patient name: Ana Rosa Ying  : 1995  MRN: [id number]  Referring provider: Stephanie Suarez PA-C  Dx:   Encounter Diagnosis     ICD-10-CM    1  Patellofemoral disorder of right knee M22 2X1 Ambulatory referral to Physical Therapy       Start Time: 810  Stop Time: 910  Total time in clinic (min): 60 minutes    Assessment  Assessment details: Ana Rosa Ying is a 27 y o  female who presents with signs and symptoms consistent of right patellofemoral pain  Patient notes that she noticed she started to notice having some R knee pain at the end of December when she was at work which she walks a lot  Patient denies any type of ALESSIO and that the pain started on gradually  Patient notes that when she was off of work for 2 weeks the pain got better but once she went back it started again  Patient notes R knee pain underneath the knee cap more on the medial side  Patient describes the pain as a sharp pinching feeling  Denies numbness and tingling as well as radiating pain  Aggravating factors include, going up and downs, squatting, moving the foot between the petals, and prolong periods of walking and standing  Patient presents with R knee pain with function, decreased hip and knee strength, decreased hip ROM and decreased joint mobility throughout the patella in all directions  Positive patellar apprehension testing specifically in the lateral direction  Functional squats and step-down testing demonstrated slight knee valgus which could be contributing to increased pain with activities  All ligamentous testing negative indicating no involvement collateral or meniscal structures  Due to these impairments, Patient has difficulty performing a/iadls and recreational activities  Patient would benefit from skilled physical therapy to address the impairments, improve their level of function, and to improve their overall quality of life      Impairments: abnormal gait, abnormal or restricted ROM, impaired physical strength, lacks appropriate home exercise program and pain with function    Symptom irritability: moderateUnderstanding of Dx/Px/POC: good   Prognosis: good    Goals  Short Term Goals: to be achieved by 4 weeks  1) Patient to be independent with basic HEP  2) Decrease pain to 4/10 at its worst   3) Increase hip and knee ROM by 5-10 degrees   4) Increase LE strength by 1/2 MMT grade in all deficient planes  Long Term Goals: to be achieved by discharge  1) FOTO equal to or greater than 75 indicating improvements with overall function  2) Patient will be able to tolerate prolong walking with little to no pain in order to participate in daily and work activities  3) Patient will be able to tolerate ascending and descending stairs with little to no pain in order to participate in daily activities  4) Patient to be independent in comprehensive HEP  Plan  Plan details: 1x week for 4-6 weeks   Patient would benefit from: PT eval and skilled physical therapy  Planned therapy interventions: manual therapy, neuromuscular re-education, patient education, therapeutic activities, therapeutic exercise and home exercise program  Treatment plan discussed with: patient        Subjective Evaluation    History of Present Illness  Mechanism of injury: History of Current Injury: Patient notes that she noticed she started to notice having some R knee pain at the end of December when she was at work which she walks a lot  Patient denies any type of ALESSIO and that the pain started on gradually  Patient notes that when she was off of work for 2 weeks the pain got better but once she went back it started again  Patient has a history of bilateral hip bursitis  Pain location/Descriptors: Patient notes R knee pain underneath the knee cap more on the medial side  Patient describes the pain as a sharp pinching feeling  Denies numbness and tingling as well as radiating pain     Aggravating factors: going up and downs, squatting, moving the foot between the petals, and prolong periods of walking and standing  Easing factors: Patient reports that ice/heat helped the pain to the extent as well as rest  Patient is taking mexolicam but it does not seem to help  24 HR pattern: Patient notes no changes in pain  Imaging: no imaging  Special Questions: Patient notes that if she moves wrong when she is sleeping shell wake up not usually not very frequent  Patient goals: Patient reports that her goals for physical therapy would be to decrease the pain with activities  Hobbies/Interest: Patient was going to the gym  Occupation: Patient 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  The days she walks more the more the knee pain flares up  Pain  Current pain ratin  At best pain ratin  At worst pain ratin  Location: R knee medial knee cap  Quality: sharp  Relieving factors: relaxation  Aggravating factors: stair climbing and walking    Patient Goals  Patient goals for therapy: decreased pain  Patient goal: Patient reports that her goals for physical therapy would be to decrease the pain with activities  Objective     Active Range of Motion   Left Hip   Flexion: [facility]  External rotation (90/90): 30 degrees   Internal rotation (90/90): 35 degrees     Right Hip   External rotation (90/90): 30 degrees   Internal rotation (90/90): 35 degrees   Left Knee   Normal active range of motion    Right Knee   Normal active range of motion    Strength/Myotome Testing     Left Knee   Flexion: 4  Extension: 4    Right Knee   Flexion: 4  Extension: 4    Left Ankle/Foot   Dorsiflexion: 5  Plantar flexion: 5    Right Ankle/Foot   Dorsiflexion: 5  Plantar flexion: 5    Tests     Left Hip   Positive piriformis  Negative Ely's  SLR: Negative  Popliteal angle: 30  Right Hip   Positive piriformis  Negative Ely's  SLR: Negative  Popliteal angle: 30       Right Knee   Positive patellar apprehension and patella-femoral grind  Negative anterior drawer, lateral Vonnie, medial Vonnie, Thessaly's test at 5 degrees and Thessaly's test at 20 degrees  Functional Assessment        Comments  Step lexii test= slight valgus on R knee with some pain  Squat=slight valgus on R knee with some pain                 Precautions: Anxiety and Depression       Manual                                                                                   Exercise Diary              Sidelying hip abduction with TB HEP            Hamstring Stretch HEP            Bridge with alternate LE extension HEP            Piriformis stretch HEP            Bosu squats              [facility] Squats              X-walks             Quad focus step-up              Knee flex/ext machine             Standing clamshells             Squats with TB             Single leg bridges             Lateral taps              Step up with twist              Single leg star drill              Rotational step ups with band             RDLS             Curtsey taps                          Bike                  Modalities

## 2020-02-07 ENCOUNTER — EVALUATION (OUTPATIENT)
Dept: PHYSICAL THERAPY | Facility: CLINIC | Age: 25
End: 2020-02-07
Payer: COMMERCIAL

## 2020-02-07 DIAGNOSIS — M22.2X1 PATELLOFEMORAL DISORDER OF RIGHT KNEE: ICD-10-CM

## 2020-02-07 PROCEDURE — 97161 PT EVAL LOW COMPLEX 20 MIN: CPT | Performed by: PHYSICAL THERAPIST

## 2020-02-07 PROCEDURE — 97110 THERAPEUTIC EXERCISES: CPT | Performed by: PHYSICAL THERAPIST

## 2020-02-11 NOTE — PROGRESS NOTES
Daily Note     Today's date: 2020  Patient name: Teena Bello  : 1995  MRN: [id number]  Referring provider: Laurent Pritchett PA-C  Dx: No diagnosis found  Subjective: ***      Objective: See treatment diary below      Assessment: Patient tolerated treatment well  Patient would benefit from continued PT      Plan: Continue per plan of care        Precautions: Anxiety and Depression       Manual                                                                                   Exercise Diary              Sidelying hip abduction with TB HEP            Hamstring Stretch HEP            Bridge with alternate LE extension HEP            Piriformis stretch HEP            Bosu squats              [place] Squats              X-walks             Quad focus step-up              Knee flex/ext machine             Standing clamshells             Squats with TB             Single leg bridges             Lateral taps              Step up with twist              Single leg star drill              Rotational step ups with band             RDLS             Curtsey taps                          Bike                  Modalities

## 2020-02-12 ENCOUNTER — APPOINTMENT (OUTPATIENT)
Dept: PHYSICAL THERAPY | Facility: CLINIC | Age: 25
End: 2020-02-12
Payer: COMMERCIAL

## 2020-02-18 ENCOUNTER — TELEPHONE (OUTPATIENT)
Dept: FAMILY MEDICINE CLINIC | Facility: OTHER | Age: 25
End: 2020-02-18

## 2020-02-18 ENCOUNTER — OFFICE VISIT (OUTPATIENT)
Dept: FAMILY MEDICINE CLINIC | Facility: OTHER | Age: 25
End: 2020-02-18

## 2020-02-18 VITALS
WEIGHT: 219.38 LBS | DIASTOLIC BLOOD PRESSURE: 64 MMHG | BODY MASS INDEX: 35.26 KG/M2 | HEART RATE: 71 BPM | TEMPERATURE: 99.1 F | HEIGHT: 66 IN | OXYGEN SATURATION: 97 % | SYSTOLIC BLOOD PRESSURE: 112 MMHG

## 2020-02-18 DIAGNOSIS — Z28.21 REFUSED INFLUENZA VACCINE: ICD-10-CM

## 2020-02-18 DIAGNOSIS — S99.921D INJURY OF RIGHT FOOT, SUBSEQUENT ENCOUNTER: Primary | ICD-10-CM

## 2020-02-18 PROCEDURE — 99214 OFFICE O/P EST MOD 30 MIN: CPT | Performed by: FAMILY MEDICINE

## 2020-02-18 PROCEDURE — 1036F TOBACCO NON-USER: CPT | Performed by: FAMILY MEDICINE

## 2020-02-18 PROCEDURE — 3008F BODY MASS INDEX DOCD: CPT | Performed by: FAMILY MEDICINE

## 2020-02-18 NOTE — PROGRESS NOTES
Daily Note     Today's date: 2020  Patient name: Sumit Gutiérrez  : 1995  MRN: [id number]  Referring provider: Estelita Barry PA-C  Dx: No diagnosis found  Subjective: ***      Objective: See treatment diary below      Assessment: Patient tolerated treatment well  Patient would benefit from continued PT      Plan: Continue per plan of care        Precautions: Anxiety and Depression       Manual                                                                                   Exercise Diary              Sidelying hip abduction with TB HEP            Hamstring Stretch HEP            Bridge with alternate LE extension HEP            Piriformis stretch HEP            Bosu squats              [place] Squats              X-walks             Quad focus step-up              Knee flex/ext machine             Standing clamshells             Squats with TB             Single leg bridges             Lateral taps              Step up with twist              Single leg star drill              Rotational step ups with band             RDLS             Curtsey taps                          Bike                  Modalities

## 2020-02-18 NOTE — PROGRESS NOTES
BMI Counseling: Body mass index is 35 41 kg/m²  The BMI is above normal  Nutrition recommendations include reducing portion sizes, decreasing overall calorie intake, 3-5 servings of fruits/vegetables daily and reducing fast food intake  Assessment/Plan:       Problem List Items Addressed This Visit     None      Visit Diagnoses     Injury of right foot, subsequent encounter    -  Primary    Rice therapy to be continued  Patient is to have light duty at work for 2 weeks and notice provided to her today  She may continue with over-the-counter Tylenol as needed for pain  Continue with the footwear as recommended per patient 1st for the 1st 2 weeks  Follow-up if not better in 2 weeks  Info from patient First provided by patient was reviewed including a foot x-ray which did not show any fractures  HCM:   Patient is due for a physical she is also overdue for vaccinations including the flu shot tetanus shot in each PV vaccine patient refused to have any of the vaccinations done today  Risk benefits alternatives discussed in detail she would like to return for a physical and do tetanus vaccine and HPV start at the time however refuses the flu shot  Physical up appointment was arranged for before her leave today  Subjective:      Patient ID: Víctor Hernandez is a 27 y o  female  Patient is here for follow-up visit in regards to right injury as of 3 days ago  She was in a hotel room with her friend and she had some kind of appliance from the wall fell and it fell on her right foot lateral part since then the lateral part of the foot on top is hurting and there is a bruise there  She was seen at [facility] on  The day of injury and an x-ray was done which did not show any fractures she was told this is a strain and a bruise she was given a footwear to wear on the right side and she was told to avoid standing for prolonged times and also avoiding activities for couple weeks    She states she works pretty much as 7-8 hours every day stands on her feet and works with [employer] and she is going to need a note for light duty so that she can be accommodated at work for the next 2 weeks  She is able to ambulate okay at this point and the bruise appears to be starting to feel better less swollen per patient  The following portions of the patient's history were reviewed and updated as appropriate:   She  has a past medical history of Anxiety, Depression, and Fatigue  She   Patient Active Problem List    Diagnosis Date Noted    Manic behavior ([facility] [address] ) 08/12/2019    Anxiety and depression 05/18/2017    Fatigue 05/18/2017     She  has no past surgical history on file  Her family history is not on file  She  reports that she has never smoked  She has never used smokeless tobacco  She reports that she does not drink alcohol or use drugs  Current Outpatient Medications   Medication Sig Dispense Refill    LORazepam (ATIVAN) 0 5 mg tablet Take 1-2 tablets (0 5-1 mg total) by mouth 2 (two) times a day as needed for anxiety 30 tablet 0    meloxicam (MOBIC) 15 mg tablet Take 1 tablet (15 mg total) by mouth daily 30 tablet 0    sertraline (ZOLOFT) 100 mg tablet Take 1 tablet (100 mg total) by mouth daily 30 tablet 3     No current facility-administered medications for this visit        Current Outpatient Medications on File Prior to Visit   Medication Sig    LORazepam (ATIVAN) 0 5 mg tablet Take 1-2 tablets (0 5-1 mg total) by mouth 2 (two) times a day as needed for anxiety    meloxicam (MOBIC) 15 mg tablet Take 1 tablet (15 mg total) by mouth daily    sertraline (ZOLOFT) 100 mg tablet Take 1 tablet (100 mg total) by mouth daily    [DISCONTINUED] ARIPiprazole (ABILIFY) 5 mg tablet Take 1 tablet (5 mg total) by mouth daily (Patient not taking: Reported on 1/28/2020)    [DISCONTINUED] naproxen (NAPROSYN) 500 mg tablet Take 1 tablet (500 mg total) by mouth 2 (two) times a day with meals for 14 doses    [DISCONTINUED] sertraline (ZOLOFT) 50 mg tablet Take 1 tablet daily along with 100 mg tablet (150 mg total daily dose) (Patient not taking: Reported on 1/28/2020)     No current facility-administered medications on file prior to visit  She is allergic to penicillins       Review of Systems   Constitutional: Negative for fatigue, fever and unexpected weight change  HENT: Negative for congestion  Eyes: Negative for visual disturbance  Respiratory: Negative for cough and shortness of breath  Cardiovascular: Negative for chest pain and leg swelling  Gastrointestinal: Negative for abdominal pain, nausea and vomiting  Musculoskeletal: Positive for arthralgias, gait problem and myalgias  Skin: Positive for color change  Negative for rash  Neurological: Negative for tremors, weakness and headaches  Hematological: Does not bruise/bleed easily  Objective:      /64 (BP Location: Left arm, Patient Position: Sitting, Cuff Size: Large)   Pulse 71   Temp 99 1 °F (37 3 °C) (Tympanic)   Ht 5' 6" (1 676 m)   Wt 99 5 kg (219 lb 6 oz)   SpO2 97%   BMI 35 41 kg/m²          Physical Exam   Constitutional: She is oriented to person, place, and time  She appears well-developed and well-nourished  No distress  HENT:   Head: Normocephalic and atraumatic  Cardiovascular: Normal rate, regular rhythm and normal heart sounds  No murmur heard  Pulmonary/Chest: Effort normal and breath sounds normal  No respiratory distress  She has no wheezes  Abdominal: Soft  Bowel sounds are normal  She exhibits no distension  There is no tenderness  Musculoskeletal: She exhibits edema and tenderness  Feet:    Neurological: She is alert and oriented to person, place, and time  Skin: Skin is warm and dry  No rash noted  She is not diaphoretic  Psychiatric: She has a normal mood and affect  Her behavior is normal    Nursing note and vitals reviewed

## 2020-02-18 NOTE — TELEPHONE ENCOUNTER
Checo Green (from [facility] [employer] called) she needs clarifications on the light duty work note that she received today from patient    They need specifics of limitations for light duty standing, and walking  Please call Checo Green  At [phone] ext [phone]  Thank you

## 2020-02-18 NOTE — LETTER
February 18, 2020     Patient: Hong Donald   YOB: 1995   Date of Visit: 2/18/2020       To Whom it May Concern:    Hong Donald is under my professional care  She was seen in my office on 2/18/2020  She may return to work with limitations of being on light duty for 2 weeks as well as being able to wear the special foot wear on her right side  If you have any questions or concerns, please don't hesitate to call           Sincerely,          Russel Tavares MD        CC: No Recipients

## 2020-02-18 NOTE — PATIENT INSTRUCTIONS
Foot Sprain, Ambulatory Care   GENERAL INFORMATION:   A foot sprain  is caused by a stretched or torn ligament in the foot or toe  Ligaments are tough tissues that connect bones  A foot sprain usually occurs during sports when your moves in a twist motion and your foot stays in place  Common symptoms include the following:   · Bruising or changes in skin color    · Inability to put weight on your foot    · Pain, tenderness, and swelling  Seek immediate care for the following symptoms:   · Cold or numbness below the injury, such as in your toes    · Increased pain, even after taking pain medicine    · Bluish or pale skin on your injured foot  Treatment for a foot sprain  may include pain medicine and physical therapy  Treatment may also include a support device, such as a brace, cast, or splint  These devices limit movement and protect further injury  Care for a foot sprain:   · Rest  to limit movement in your sprained foot for the first 2 to 3 days  Use crutches as directed to take weight off your foot while it heals  · Apply ice  on your foot for 15 to 20 minutes every hour or as directed  Use an ice pack, or put crushed ice in a plastic bag  Cover it with a towel  Ice helps prevent tissue damage and decreases swelling and pain  · Compress  your foot as directed with tape or an elastic bandage to support your foot  You may need a splint on your foot for support if your sprain is severe  Wear your splint for as many days as directed  · Elevate  your foot above the level of your heart as often as you can  This will help decrease swelling and pain  Prop your foot on pillows or blankets to keep it elevated comfortably  · Exercise  your foot as directed to improve your strength and help decrease stiffness  The exercises and physical therapy can help restore strength and increase the range of motion in your foot   Ask your healthcare provider when you can return to your normal activities or play sports  Prevent another foot sprain:   · Warm up and stretch before you exercise  · Do not exercise when you feel pain or are tired  · Wear equipment to protect yourself when you play sports  Follow up with your healthcare provider as directed:  Write down your questions so you remember to ask them during your visits  CARE AGREEMENT:   You have the right to help plan your care  Learn about your health condition and how it may be treated  Discuss treatment options with your caregivers to decide what care you want to receive  You always have the right to refuse treatment  The above information is an  only  It is not intended as medical advice for individual conditions or treatments  Talk to your doctor, nurse or pharmacist before following any medical regimen to see if it is safe and effective for you  © 2014 [address] is for End User's use only and may not be sold, redistributed or otherwise used for commercial purposes  All illustrations and images included in CareNotes® are the copyrighted property of [employer]  or Hrien Chaves

## 2020-02-18 NOTE — TELEPHONE ENCOUNTER
Called Indira at the number and went to her message    Left message for her to call back Wednesday from [address] [place] [address]

## 2020-02-19 NOTE — TELEPHONE ENCOUNTER
Spoke with Ronel Damon today in regards to patient work accommodations  She will be having limited weight bearing daily to 4 hours per day for the next 2 weeks then back to normal job duties

## 2020-02-20 ENCOUNTER — APPOINTMENT (OUTPATIENT)
Dept: PHYSICAL THERAPY | Facility: CLINIC | Age: 25
End: 2020-02-20
Payer: COMMERCIAL

## 2020-03-19 NOTE — PROGRESS NOTES
PT DISCHARGE  Patient did not return after initial evaluation  Patient called twice after 2 no shows without a return phone call  Progress unable to be formally assessed  Patient DC this date

## 2020-06-10 ENCOUNTER — TELEPHONE (OUTPATIENT)
Dept: FAMILY MEDICINE CLINIC | Facility: OTHER | Age: 25
End: 2020-06-10

## 2020-06-11 ENCOUNTER — OFFICE VISIT (OUTPATIENT)
Dept: FAMILY MEDICINE CLINIC | Facility: OTHER | Age: 25
End: 2020-06-11
Payer: COMMERCIAL

## 2020-06-11 VITALS
WEIGHT: 220 LBS | HEART RATE: 74 BPM | HEIGHT: 66 IN | DIASTOLIC BLOOD PRESSURE: 68 MMHG | TEMPERATURE: 97.5 F | BODY MASS INDEX: 35.36 KG/M2 | SYSTOLIC BLOOD PRESSURE: 104 MMHG | OXYGEN SATURATION: 98 %

## 2020-06-11 DIAGNOSIS — R22.0 CHEEK MASS: Primary | ICD-10-CM

## 2020-06-11 DIAGNOSIS — H53.2 DIPLOPIA: ICD-10-CM

## 2020-06-11 PROCEDURE — 1036F TOBACCO NON-USER: CPT | Performed by: NURSE PRACTITIONER

## 2020-06-11 PROCEDURE — 99213 OFFICE O/P EST LOW 20 MIN: CPT | Performed by: NURSE PRACTITIONER

## 2020-06-11 PROCEDURE — 3008F BODY MASS INDEX DOCD: CPT | Performed by: NURSE PRACTITIONER

## 2020-06-13 ENCOUNTER — HOSPITAL ENCOUNTER (OUTPATIENT)
Dept: ULTRASOUND IMAGING | Facility: HOSPITAL | Age: 25
Discharge: HOME | End: 2020-06-13
Attending: NURSE PRACTITIONER
Payer: COMMERCIAL

## 2020-06-13 DIAGNOSIS — H53.2 DIPLOPIA: ICD-10-CM

## 2020-06-13 PROCEDURE — 76536 US EXAM OF HEAD AND NECK: CPT

## 2020-06-18 ENCOUNTER — TELEPHONE (OUTPATIENT)
Dept: FAMILY MEDICINE CLINIC | Facility: OTHER | Age: 25
End: 2020-06-18

## 2020-10-05 ENCOUNTER — APPOINTMENT (OUTPATIENT)
Dept: LAB | Facility: MEDICAL CENTER | Age: 25
End: 2020-10-05
Payer: COMMERCIAL

## 2020-10-05 DIAGNOSIS — J34.3 NASAL TURBINATE HYPERTROPHY: ICD-10-CM

## 2020-10-05 DIAGNOSIS — J34.2 NASAL SEPTAL DEVIATION: ICD-10-CM

## 2020-10-05 LAB
ANION GAP SERPL CALCULATED.3IONS-SCNC: 3 MMOL/L (ref 4–13)
BASOPHILS # BLD AUTO: 0.03 THOUSANDS/ΜL (ref 0–0.1)
BASOPHILS NFR BLD AUTO: 1 % (ref 0–1)
BUN SERPL-MCNC: 7 MG/DL (ref 5–25)
CALCIUM SERPL-MCNC: 9.5 MG/DL (ref 8.3–10.1)
CHLORIDE SERPL-SCNC: 108 MMOL/L (ref 100–108)
CO2 SERPL-SCNC: 29 MMOL/L (ref 21–32)
CREAT SERPL-MCNC: 0.82 MG/DL (ref 0.6–1.3)
EOSINOPHIL # BLD AUTO: 0.03 THOUSAND/ΜL (ref 0–0.61)
EOSINOPHIL NFR BLD AUTO: 1 % (ref 0–6)
ERYTHROCYTE [DISTWIDTH] IN BLOOD BY AUTOMATED COUNT: 14.9 % (ref 11.6–15.1)
GFR SERPL CREATININE-BSD FRML MDRD: 101 ML/MIN/1.73SQ M
GLUCOSE P FAST SERPL-MCNC: 85 MG/DL (ref 65–99)
HCT VFR BLD AUTO: 41.2 % (ref 34.8–46.1)
HGB BLD-MCNC: 12.9 G/DL (ref 11.5–15.4)
IMM GRANULOCYTES # BLD AUTO: 0.02 THOUSAND/UL (ref 0–0.2)
IMM GRANULOCYTES NFR BLD AUTO: 0 % (ref 0–2)
LYMPHOCYTES # BLD AUTO: 2.18 THOUSANDS/ΜL (ref 0.6–4.47)
LYMPHOCYTES NFR BLD AUTO: 36 % (ref 14–44)
MCH RBC QN AUTO: 26 PG (ref 26.8–34.3)
MCHC RBC AUTO-ENTMCNC: 31.3 G/DL (ref 31.4–37.4)
MCV RBC AUTO: 83 FL (ref 82–98)
MONOCYTES # BLD AUTO: 0.49 THOUSAND/ΜL (ref 0.17–1.22)
MONOCYTES NFR BLD AUTO: 8 % (ref 4–12)
NEUTROPHILS # BLD AUTO: 3.31 THOUSANDS/ΜL (ref 1.85–7.62)
NEUTS SEG NFR BLD AUTO: 54 % (ref 43–75)
NRBC BLD AUTO-RTO: 0 /100 WBCS
PLATELET # BLD AUTO: 300 THOUSANDS/UL (ref 149–390)
PMV BLD AUTO: 10.4 FL (ref 8.9–12.7)
POTASSIUM SERPL-SCNC: 4.2 MMOL/L (ref 3.5–5.3)
RBC # BLD AUTO: 4.97 MILLION/UL (ref 3.81–5.12)
SODIUM SERPL-SCNC: 140 MMOL/L (ref 136–145)
WBC # BLD AUTO: 6.06 THOUSAND/UL (ref 4.31–10.16)

## 2020-10-05 PROCEDURE — 80048 BASIC METABOLIC PNL TOTAL CA: CPT

## 2020-10-05 PROCEDURE — 36415 COLL VENOUS BLD VENIPUNCTURE: CPT

## 2020-10-05 PROCEDURE — 85025 COMPLETE CBC W/AUTO DIFF WBC: CPT

## 2020-10-07 DIAGNOSIS — Z98.890 STATUS POST NASAL SEPTOPLASTY: Primary | ICD-10-CM

## 2020-10-07 RX ORDER — HYDROCODONE BITARTRATE AND ACETAMINOPHEN 5; 325 MG/1; MG/1
1 TABLET ORAL EVERY 6 HOURS PRN
Qty: 15 TABLET | Refills: 0 | Status: SHIPPED | OUTPATIENT
Start: 2020-10-07 | End: 2022-08-08

## 2020-10-07 RX ORDER — CLARITHROMYCIN 500 MG/1
500 TABLET, COATED ORAL EVERY 12 HOURS SCHEDULED
Qty: 14 TABLET | Refills: 0 | Status: SHIPPED | OUTPATIENT
Start: 2020-10-07 | End: 2020-10-14

## 2021-04-30 ENCOUNTER — APPOINTMENT (EMERGENCY)
Dept: RADIOLOGY | Facility: HOSPITAL | Age: 26
End: 2021-04-30
Payer: OTHER MISCELLANEOUS

## 2021-04-30 ENCOUNTER — APPOINTMENT (OUTPATIENT)
Dept: URGENT CARE | Age: 26
End: 2021-04-30
Payer: OTHER MISCELLANEOUS

## 2021-04-30 ENCOUNTER — HOSPITAL ENCOUNTER (EMERGENCY)
Facility: HOSPITAL | Age: 26
Discharge: HOME | End: 2021-04-30
Attending: EMERGENCY MEDICINE | Admitting: EMERGENCY MEDICINE
Payer: OTHER MISCELLANEOUS

## 2021-04-30 VITALS
HEIGHT: 65 IN | HEART RATE: 81 BPM | RESPIRATION RATE: 16 BRPM | SYSTOLIC BLOOD PRESSURE: 114 MMHG | TEMPERATURE: 98.7 F | BODY MASS INDEX: 34.27 KG/M2 | DIASTOLIC BLOOD PRESSURE: 67 MMHG | OXYGEN SATURATION: 99 % | WEIGHT: 205.69 LBS

## 2021-04-30 DIAGNOSIS — S49.92XA INJURY OF LEFT UPPER EXTREMITY, INITIAL ENCOUNTER: Primary | ICD-10-CM

## 2021-04-30 LAB
AMPHETAMINES SERPL QL SCN: NEGATIVE
BARBITURATES UR QL: NEGATIVE
BENZODIAZ UR QL: NEGATIVE
COCAINE UR QL: NEGATIVE
EXT PREG TEST URINE: NEGATIVE
EXT. CONTROL ED NAV: NORMAL
METHADONE UR QL: NEGATIVE
OPIATES UR QL SCN: NEGATIVE
OXYCODONE+OXYMORPHONE UR QL SCN: NEGATIVE
PCP UR QL: NEGATIVE
THC UR QL: NEGATIVE

## 2021-04-30 PROCEDURE — 73090 X-RAY EXAM OF FOREARM: CPT

## 2021-04-30 PROCEDURE — 99284 EMERGENCY DEPT VISIT MOD MDM: CPT

## 2021-04-30 PROCEDURE — 99213 OFFICE O/P EST LOW 20 MIN: CPT | Performed by: PREVENTIVE MEDICINE

## 2021-04-30 PROCEDURE — 99284 EMERGENCY DEPT VISIT MOD MDM: CPT | Performed by: EMERGENCY MEDICINE

## 2021-04-30 PROCEDURE — 81025 URINE PREGNANCY TEST: CPT | Performed by: EMERGENCY MEDICINE

## 2021-04-30 PROCEDURE — 80307 DRUG TEST PRSMV CHEM ANLYZR: CPT | Performed by: EMERGENCY MEDICINE

## 2021-04-30 RX ORDER — IBUPROFEN 600 MG/1
600 TABLET ORAL EVERY 8 HOURS PRN
Qty: 15 TABLET | Refills: 0 | Status: SHIPPED | OUTPATIENT
Start: 2021-04-30 | End: 2022-08-08

## 2021-04-30 RX ORDER — IBUPROFEN 400 MG/1
400 TABLET ORAL ONCE
Status: COMPLETED | OUTPATIENT
Start: 2021-04-30 | End: 2021-04-30

## 2021-04-30 RX ORDER — ACETAMINOPHEN 325 MG/1
650 TABLET ORAL ONCE
Status: COMPLETED | OUTPATIENT
Start: 2021-04-30 | End: 2021-04-30

## 2021-04-30 RX ORDER — CAPSAICIN 0.07 G/100G
CREAM TOPICAL 3 TIMES DAILY
Qty: 28.3 G | Refills: 0 | Status: SHIPPED | OUTPATIENT
Start: 2021-04-30 | End: 2022-08-08

## 2021-04-30 RX ADMIN — IBUPROFEN 400 MG: 400 TABLET ORAL at 04:04

## 2021-04-30 RX ADMIN — ACETAMINOPHEN 650 MG: 325 TABLET, FILM COATED ORAL at 04:03

## 2021-04-30 NOTE — Clinical Note
Peterson Cotto was seen and treated in our emergency department on 4/30/2021  Diagnosis:     Vivian Blevins  may return to work on return date  She may return on this date: 05/02/2021         If you have any questions or concerns, please don't hesitate to call        Elli Tovar PA-C    ______________________________           _______________          _______________  Hospital Representative                              Date                                Time

## 2021-05-01 NOTE — ED PROVIDER NOTES
History  Chief Complaint   Patient presents with    Arm Injury     Around 0230 patient was at work and fell off a step stool when a wheel broke off and struck her L forearm on metal      HPI     Patient is a pleasant 61-year-old female that reports to emergency department with achy nonradiating pain in the left forearm  Patient notes that she fell off a step stool and hit her left forearm while she was working  No other injuries  No head trauma  No loss conscious  No fevers, chills, sweats, nausea, vomiting, diarrhea  She does have some swelling along the left forearm, no other injuries, no crepitus, no deformity  Medical decision makin-year-old female, will treat for contusion, follow-up instructions, return precautions  Prior to Admission Medications   Prescriptions Last Dose Informant Patient Reported? Taking? HYDROcodone-acetaminophen (NORCO) 5-325 mg per tablet   No No   Sig: Take 1 tablet by mouth every 6 (six) hours as needed for painMax Daily Amount: 4 tablets   LORazepam (ATIVAN) 0 5 mg tablet  Self No No   Sig: Take 1-2 tablets (0 5-1 mg total) by mouth 2 (two) times a day as needed for anxiety   meloxicam (MOBIC) 15 mg tablet  Self No No   Sig: Take 1 tablet (15 mg total) by mouth daily   Patient not taking: Reported on 2020   sertraline (ZOLOFT) 100 mg tablet  Self No No   Sig: Take 1 tablet (100 mg total) by mouth daily   Patient not taking: Reported on 2020      Facility-Administered Medications: None       Past Medical History:   Diagnosis Date    Anxiety     Last assessed 2017     Depression     Last assessed 2017     Fatigue     Last assessed 2017        History reviewed  No pertinent surgical history  Family History   Problem Relation Age of Onset    Diabetes Mother      I have reviewed and agree with the history as documented      E-Cigarette/Vaping    E-Cigarette Use Never User      E-Cigarette/Vaping Substances     Social History Tobacco Use    Smoking status: Never Smoker    Smokeless tobacco: Never Used   Substance Use Topics    Alcohol use: Yes     Frequency: Monthly or less    Drug use: No       Review of Systems   Musculoskeletal:        Left-sided forearm swelling   All other systems reviewed and are negative  Physical Exam  Physical Exam  Vitals signs and nursing note reviewed  Constitutional:       Appearance: She is well-developed  HENT:      Head: Normocephalic and atraumatic  Right Ear: External ear normal       Left Ear: External ear normal    Eyes:      Conjunctiva/sclera: Conjunctivae normal    Neck:      Musculoskeletal: Normal range of motion and neck supple  Vascular: No JVD  Trachea: No tracheal deviation  Cardiovascular:      Rate and Rhythm: Normal rate and regular rhythm  Heart sounds: Normal heart sounds  Pulmonary:      Effort: Pulmonary effort is normal  No respiratory distress  Breath sounds: No wheezing or rales  Abdominal:      Palpations: Abdomen is soft  Tenderness: There is no abdominal tenderness  There is no guarding or rebound  Musculoskeletal:         General: Swelling and tenderness present  Skin:     General: Skin is warm and dry  Findings: No erythema or rash  Neurological:      General: No focal deficit present  Mental Status: She is alert and oriented to person, place, and time  Motor: No weakness  Psychiatric:         Behavior: Behavior normal          Thought Content:  Thought content normal          Vital Signs  ED Triage Vitals   Temperature Pulse Respirations Blood Pressure SpO2   04/30/21 0352 04/30/21 0343 04/30/21 0343 04/30/21 0343 04/30/21 0343   98 7 °F (37 1 °C) 81 16 114/67 99 %      Temp Source Heart Rate Source Patient Position - Orthostatic VS BP Location FiO2 (%)   04/30/21 0352 04/30/21 0343 04/30/21 0343 04/30/21 0343 --   Oral Monitor Sitting Right arm       Pain Score       04/30/21 0343       2 Vitals:    04/30/21 0343   BP: 114/67   Pulse: 81   Patient Position - Orthostatic VS: Sitting         Visual Acuity      ED Medications  Medications   acetaminophen (TYLENOL) tablet 650 mg (650 mg Oral Given 4/30/21 0403)   ibuprofen (MOTRIN) tablet 400 mg (400 mg Oral Given 4/30/21 0404)       Diagnostic Studies  Results Reviewed     Procedure Component Value Units Date/Time    Rapid drug screen, urine [[id number]]  (Normal) Collected: 04/30/21 0403    Lab Status: Final result Specimen: Urine, Clean Catch Updated: 04/30/21 0418     Amph/Meth UR Negative     Barbiturate Ur Negative     Benzodiazepine Urine Negative     Cocaine Urine Negative     Methadone Urine Negative     Opiate Urine Negative     PCP Ur Negative     THC Urine Negative     Oxycodone Urine Negative    Narrative:      FOR MEDICAL PURPOSES ONLY  IF CONFIRMATION NEEDED PLEASE CONTACT THE LAB WITHIN 5 DAYS  Drug Screen Cutoff Levels:  AMPHETAMINE/METHAMPHETAMINES  1000 ng/mL  BARBITURATES     200 ng/mL  BENZODIAZEPINES     200 ng/mL  COCAINE      300 ng/mL  METHADONE      300 ng/mL  OPIATES      300 ng/mL  PHENCYCLIDINE     25 ng/mL  THC       50 ng/mL  OXYCODONE      100 ng/mL    POCT pregnancy, urine [[id number]]  (Normal) Resulted: 04/30/21 0404    Lab Status: Final result Updated: 04/30/21 0404     EXT PREG TEST UR (Ref: Negative) negative     Control valid                 XR forearm 2 views LEFT   Final Result by Angelito Nair MD (04/30 3886)      No acute osseous abnormality  Workstation performed: [id number]                    Procedures  Procedures         ED Course                             SBIRT 20yo+      Most Recent Value   SBIRT (22 yo +)   In order to provide better care to our patients, we are screening all of our patients for alcohol and drug use  Would it be okay to ask you these screening questions?   No Filed at: 04/30/2021 0407                    MDM    Disposition  Final diagnoses:   Injury of left upper extremity, initial encounter     Time reflects when diagnosis was documented in both MDM as applicable and the Disposition within this note     Time User Action Codes Description Comment    4/30/2021  4:49 AM Ngozi Diaz Add [[id number]] Injury of left upper extremity, initial encounter       ED Disposition     ED Disposition Condition Date/Time Comment    Discharge Stable Fri Apr 30, 2021  4:49 AM Jackelyn Santiago discharge to home/self care              Follow-up Information     Follow up With Specialties Details Why Contact Info Additional Information    Tika Layton MD Family Medicine Call in 1 day For follow up [address]       [address] [facility] NEURORE[facility]AB [facility] Call  For follow up, As needed [address] [place] [address] [facility], [facility] [address], [address], [place], [place], [facility]    [address] Emergency Department Emergency Medicine Go to  If symptoms worsen [address] [facility] ER [id number] [place] [address] Emergency Department, [address], [place], [place], [address]          Discharge Medication List as of 4/30/2021  4:51 AM      START taking these medications    Details   capsicum (ZOSTRIX) 0 075 % topical cream Apply topically 3 (three) times a day, Starting Fri 4/30/2021, Normal      ibuprofen (MOTRIN) 600 mg tablet Take 1 tablet (600 mg total) by mouth every 8 (eight) hours as needed for mild pain or moderate pain for up to 5 days, Starting Fri 4/30/2021, Until Wed 5/5/2021, Normal         CONTINUE these medications which have NOT CHANGED    Details   HYDROcodone-acetaminophen (NORCO) 5-325 mg per tablet Take 1 tablet by mouth every 6 (six) hours as needed for painMax Daily Amount: 4 tablets, Starting Wed 10/7/2020, Normal      LORazepam (ATIVAN) 0 5 mg tablet Take 1-2 tablets (0 5-1 mg total) by mouth 2 (two) times a day as needed for anxiety, Starting Mon 10/22/2018, Normal      meloxicam (MOBIC) 15 mg tablet Take 1 tablet (15 mg total) by mouth daily, Starting Tue 1/28/2020, Normal      sertraline (ZOLOFT) 100 mg tablet Take 1 tablet (100 mg total) by mouth daily, Starting Mon 11/26/2018, Normal           No discharge procedures on file      PDMP Review       Value Time User    PDMP Reviewed  Yes 10/7/2020  9:08 AM Julian Dawn MD          ED Provider  Electronically Signed by           Jose Cesar MD  04/30/21 1957

## 2021-05-05 ENCOUNTER — APPOINTMENT (OUTPATIENT)
Dept: URGENT CARE | Age: 26
End: 2021-05-05
Payer: OTHER MISCELLANEOUS

## 2021-05-05 PROCEDURE — 99213 OFFICE O/P EST LOW 20 MIN: CPT | Performed by: PREVENTIVE MEDICINE

## 2021-12-18 ENCOUNTER — NURSE TRIAGE (OUTPATIENT)
Dept: OTHER | Facility: OTHER | Age: 26
End: 2021-12-18

## 2021-12-18 DIAGNOSIS — Z11.59 SPECIAL SCREENING EXAMINATION FOR VIRAL DISEASE: Primary | ICD-10-CM

## 2022-04-27 ENCOUNTER — OFFICE VISIT (OUTPATIENT)
Dept: FAMILY MEDICINE CLINIC | Facility: OTHER | Age: 27
End: 2022-04-27
Payer: COMMERCIAL

## 2022-04-27 VITALS
RESPIRATION RATE: 18 BRPM | HEART RATE: 72 BPM | HEIGHT: 65 IN | SYSTOLIC BLOOD PRESSURE: 110 MMHG | BODY MASS INDEX: 33.85 KG/M2 | DIASTOLIC BLOOD PRESSURE: 80 MMHG | OXYGEN SATURATION: 98 % | WEIGHT: 203.2 LBS | TEMPERATURE: 98 F

## 2022-04-27 DIAGNOSIS — J20.8 ACUTE VIRAL BRONCHITIS: Primary | ICD-10-CM

## 2022-04-27 DIAGNOSIS — R05.9 COUGH: ICD-10-CM

## 2022-04-27 PROCEDURE — 3008F BODY MASS INDEX DOCD: CPT

## 2022-04-27 PROCEDURE — 99213 OFFICE O/P EST LOW 20 MIN: CPT

## 2022-04-27 RX ORDER — BENZONATATE 100 MG/1
100 CAPSULE ORAL 3 TIMES DAILY PRN
Qty: 20 CAPSULE | Refills: 0 | Status: SHIPPED | OUTPATIENT
Start: 2022-04-27 | End: 2022-08-08

## 2022-04-27 NOTE — LETTER
April 27, 2022     Patient: Ami Vanessa  YOB: 1995  Date of Visit: 4/27/2022      To Whom it May Concern:    Ami Vanessa is under my professional care  Preethi Vega was seen in my office on 4/27/2022  Preethi Vega may return to work on 4/27/22 with on restrictions  Recommend patient wears a mask at work  Otherwise, she is unlikely to be contagious       If you have any questions or concerns, please don't hesitate to call           Sincerely,          Gaston Leyva MD        CC: No Recipients

## 2022-04-27 NOTE — PATIENT INSTRUCTIONS
Acute Bronchitis   AMBULATORY CARE:   Acute bronchitis  is swelling and irritation in your lungs  It is usually caused by a virus and most often happens in the winter  Bronchitis may also be caused by bacteria or by a chemical irritant, such as smoke  Common symptoms include the following:   · Cough that lasts up to 3 weeks, stuffy nose    · Hoarseness, sore throat    · A fever and chills    · Feeling more tired than usual, and body aches    · Wheezing or pain when you breathe or cough    Seek care immediately if:   · You cough up blood  · Your lips or fingernails turn blue  · You feel like you are not getting enough air when you breathe  Call your doctor if:   · Your symptoms do not go away or get worse, even after treatment  · Your cough does not get better within 4 weeks  · You have questions or concerns about your condition or care  Medicines: You may  need any of the following:  · Cough suppressants  decrease your urge to cough  · Decongestants  help loosen mucus in your lungs and make it easier to cough up  This can help you breathe easier  · Inhalers  may be given  Your healthcare provider may give you one or more inhalers to help you breathe easier and cough less  An inhaler gives your medicine to open your airways  Ask your healthcare provider to show you how to use your inhaler correctly  · Antibiotics  may be given for up to 5 days if your bronchitis is caused by bacteria  · Acetaminophen  decreases pain and fever  It is available without a doctor's order  Ask how much to take and how often to take it  Follow directions  Read the labels of all other medicines you are using to see if they also contain acetaminophen, or ask your doctor or pharmacist  Acetaminophen can cause liver damage if not taken correctly  Do not use more than 4 grams (4,000 milligrams) total of acetaminophen in one day  · NSAIDs  help decrease swelling and pain or fever   This medicine is available with or without a doctor's order  NSAIDs can cause stomach bleeding or kidney problems in certain people  If you take blood thinner medicine, always ask your healthcare provider if NSAIDs are safe for you  Always read the medicine label and follow directions  · Take your medicine as directed  Contact your healthcare provider if you think your medicine is not helping or if you have side effects  Tell him of her if you are allergic to any medicine  Keep a list of the medicines, vitamins, and herbs you take  Include the amounts, and when and why you take them  Bring the list or the pill bottles to follow-up visits  Carry your medicine list with you in case of an emergency  Self-care:   · Drink liquids as directed  You may need to drink more liquids than usual to stay hydrated  Ask how much liquid to drink each day and which liquids are best for you  · Use a cool mist humidifier  to increase air moisture in your home  This may make it easier for you to breathe and help decrease your cough  · Get more rest   Rest helps your body to heal  Slowly start to do more each day  Rest when you feel it is needed  · Avoid irritants in the air  Avoid chemicals, fumes, and dust  Wear a face mask if you must work around dust or fumes  Stay inside on days when air pollution levels are high  If you have allergies, stay inside when pollen counts are high  Do not use aerosol products, such as spray-on deodorant, bug spray, and hair spray  · Do not smoke or be around others who are smoking  Nicotine and other chemicals in cigarettes and cigars can cause lung damage  Ask your healthcare provider for information if you currently smoke and need help to quit  E-cigarettes or smokeless tobacco still contain nicotine  Talk to your healthcare provider before you use these products  Prevent acute bronchitis:       · Ask about vaccines you may need    Get a flu vaccine each year as soon as recommended, usually in September or October  Ask your healthcare provider if you should also get a pneumonia or COVID-19 vaccine  Your healthcare provider can tell you if you should also get other vaccines, and when to get them  · Prevent the spread of germs  You can decrease your risk for acute bronchitis and other illnesses by doing the following:    ? Wash your hands often with soap and water  Carry germ-killing hand lotion or gel with you  You can use the lotion or gel to clean your hands when soap and water are not available  ? Do not touch your eyes, nose, or mouth unless you have washed your hands first     ? Always cover your mouth when you cough to prevent the spread of germs  It is best to cough into a tissue or your shirt sleeve instead of into your hand  Ask those around you to cover their mouths when they cough  ? Try to avoid people who have a cold or the flu  If you are sick, stay away from others as much as possible  Follow up with your doctor as directed:  Write down questions you have so you will remember to ask them during your follow-up visits  © Copyright [employer] 2022 Information is for End User's use only and may not be sold, redistributed or otherwise used for commercial purposes  All illustrations and images included in CareNotes® are the copyrighted property of [employer]  or [facility] Amber Farias   The above information is an  only  It is not intended as medical advice for individual conditions or treatments  Talk to your doctor, nurse or pharmacist before following any medical regimen to see if it is safe and effective for you

## 2022-04-27 NOTE — PROGRESS NOTES
Assessment/Plan:  19yo healthy appearing female presents for 4 day cough that is improving  Likely viral bronchitis, no clinical signs of bacterial infection  Prescribed Tessalon Perles for symptom relief and recommended OTC cough suppressants  PRN follow-up in 4 weeks if symptoms worsen or do not resolved  Work note provided  Patient afebrile in clinic and denies fevers/checked temperatures at home  Took 3 COVID home tests, all negative  No problem-specific Assessment & Plan notes found for this encounter  Diagnoses and all orders for this visit:    Acute viral bronchitis    Cough  -     benzonatate (TESSALON PERLES) 100 mg capsule; Take 1 capsule (100 mg total) by mouth 3 (three) times a day as needed for cough          Subjective:      Patient ID: Celine Steinberg is a 27 y o  female  HPI: Cough since Sunday  Better today but still coughing  Nonproductive  Tried using "throat spray" and some cough medicine that her mother told her to take - mild improvements at best    Worse at bedtime, but able to sleep throughout once she falls asleep  Eating and drinking okay  Mild sore throat  No seasonal allergies, has chronic headaches - mild  Denies myalgias, runny nose, congestion, fever, chills, N/V/D, ear pain, drainage from any orifice  3 negative home COVID tests  +sick contact: Grandma sick on [facility], exhibited cold-like symptoms including cough, she is doing better now  The following portions of the patient's history were reviewed and updated as appropriate: allergies, current medications, past family history, past medical history, past social history, past surgical history and problem list     Review of Systems   Constitutional: Negative for chills and fever  HENT: Negative for congestion, ear pain and sore throat  Eyes: Negative for pain and visual disturbance  Respiratory: Positive for cough  Negative for shortness of breath, wheezing and stridor      Cardiovascular: Positive for chest pain (2/2 coughing  Nonpleuritic)  Negative for leg swelling  Gastrointestinal: Negative for abdominal pain, diarrhea, nausea and vomiting  Endocrine: Negative for polyuria  Genitourinary: Negative for difficulty urinating and dysuria  Musculoskeletal: Negative for myalgias  Skin: Negative for rash and wound  Allergic/Immunologic: Negative for environmental allergies and food allergies  Neurological: Negative for dizziness, light-headedness and headaches  Hematological: Does not bruise/bleed easily  All other systems reviewed and are negative  Objective:      /80   Pulse 72   Temp 98 °F (36 7 °C)   Resp 18   Ht 5' 5" (1 651 m)   Wt 92 2 kg (203 lb 3 2 oz)   SpO2 98%   BMI 33 81 kg/m²          Physical Exam  Vitals and nursing note reviewed  Constitutional:       General: She is not in acute distress  Appearance: Normal appearance  She is normal weight  She is not ill-appearing  HENT:      Head: Normocephalic and atraumatic  Right Ear: External ear normal       Left Ear: External ear normal       Nose: Nose normal       Mouth/Throat:      Mouth: Mucous membranes are moist       Pharynx: Oropharynx is clear  Eyes:      Conjunctiva/sclera: Conjunctivae normal    Cardiovascular:      Rate and Rhythm: Normal rate and regular rhythm  Pulses: Normal pulses  Heart sounds: Normal heart sounds  No murmur heard  Pulmonary:      Effort: Pulmonary effort is normal  No respiratory distress  Breath sounds: Normal breath sounds  No stridor  No wheezing, rhonchi or rales  Abdominal:      General: Abdomen is flat  Palpations: Abdomen is soft  Tenderness: There is no abdominal tenderness  There is no guarding  Musculoskeletal:         General: Normal range of motion  Cervical back: Neck supple  Skin:     General: Skin is warm and dry  Capillary Refill: Capillary refill takes less than 2 seconds     Neurological:      General: No focal deficit present  Mental Status: She is alert and oriented to person, place, and time     Psychiatric:         Mood and Affect: Mood normal          Behavior: Behavior normal          Fletcher Libman, MD  FM PGY1  4/27/2022

## 2022-04-28 ENCOUNTER — TELEPHONE (OUTPATIENT)
Dept: FAMILY MEDICINE CLINIC | Facility: OTHER | Age: 27
End: 2022-04-28

## 2022-04-28 DIAGNOSIS — R06.2 WHEEZING: Primary | ICD-10-CM

## 2022-04-28 RX ORDER — ALBUTEROL SULFATE 90 UG/1
2 AEROSOL, METERED RESPIRATORY (INHALATION) EVERY 6 HOURS PRN
Qty: 6.7 G | Refills: 0 | Status: SHIPPED | OUTPATIENT
Start: 2022-04-28 | End: 2022-08-08

## 2022-04-28 NOTE — LETTER
April 28, 2022     Patient: Edson Ruelas  YOB: 1995  Date of Visit: 4/27/2022      To Whom it May Concern:    Edson Ruelas is under my professional care  Clif Reis was seen in my office on 4/27/2022  Clif Reis may return to work on 5/1/2022  If you have any questions or concerns, please don't hesitate to call           Sincerely,          Shabana Nunez        CC: No Recipients

## 2022-04-28 NOTE — TELEPHONE ENCOUNTER
Patient notified and will pull her work note off of her mychart if she has a problem with that   She will call office back to let us know what she would like us to do next

## 2022-04-28 NOTE — TELEPHONE ENCOUNTER
Patient called and said she had to leave work early last night due to her coughing and she started wheezing and she would like to get an inhaler and also she would like to know if she can stay out of work until Sunday night (she only works tonite she is off then until  She  return to work Sunday)     Please advise   Thank you

## 2022-05-02 ENCOUNTER — TELEPHONE (OUTPATIENT)
Dept: FAMILY MEDICINE CLINIC | Facility: OTHER | Age: 27
End: 2022-05-02

## 2022-05-02 NOTE — TELEPHONE ENCOUNTER
POWER ~     Received disability forms off the fax machine this morning and put the papers in Dr Alexandra cabrera for review and to fill out

## 2022-05-31 PROBLEM — J34.89 NASAL OBSTRUCTION: Status: ACTIVE | Noted: 2022-05-31

## 2022-05-31 PROBLEM — J34.3 NASAL TURBINATE HYPERTROPHY: Status: ACTIVE | Noted: 2022-05-31

## 2022-05-31 PROBLEM — J34.2 NASAL SEPTAL DEVIATION: Status: ACTIVE | Noted: 2022-05-31

## 2022-07-27 ENCOUNTER — TELEPHONE (OUTPATIENT)
Dept: OBGYN CLINIC | Facility: CLINIC | Age: 27
End: 2022-07-27

## 2022-07-27 NOTE — TELEPHONE ENCOUNTER
Patient called, she is newly pregnant and having morning sickness  Discussed Vitamin B6 and Unisom  She is to do small frequent meals, can try esme or peppermint   If not noticing any improvement she is to call back

## 2022-08-08 ENCOUNTER — OFFICE VISIT (OUTPATIENT)
Dept: OBGYN CLINIC | Facility: CLINIC | Age: 27
End: 2022-08-08
Payer: COMMERCIAL

## 2022-08-08 VITALS
SYSTOLIC BLOOD PRESSURE: 110 MMHG | DIASTOLIC BLOOD PRESSURE: 62 MMHG | WEIGHT: 210 LBS | HEIGHT: 65 IN | BODY MASS INDEX: 34.99 KG/M2

## 2022-08-08 DIAGNOSIS — R11.0 NAUSEA: ICD-10-CM

## 2022-08-08 DIAGNOSIS — N91.2 AMENORRHEA: Primary | ICD-10-CM

## 2022-08-08 LAB — SL AMB POCT URINE HCG: POSITIVE

## 2022-08-08 PROCEDURE — 99204 OFFICE O/P NEW MOD 45 MIN: CPT | Performed by: PHYSICIAN ASSISTANT

## 2022-08-08 PROCEDURE — 81025 URINE PREGNANCY TEST: CPT | Performed by: PHYSICIAN ASSISTANT

## 2022-08-08 PROCEDURE — 76801 OB US < 14 WKS SINGLE FETUS: CPT | Performed by: PHYSICIAN ASSISTANT

## 2022-08-08 RX ORDER — ONDANSETRON 8 MG/1
8 TABLET, ORALLY DISINTEGRATING ORAL EVERY 8 HOURS PRN
Qty: 20 TABLET | Refills: 0 | Status: SHIPPED | OUTPATIENT
Start: 2022-08-08 | End: 2022-08-15 | Stop reason: SDUPTHER

## 2022-08-08 NOTE — PROGRESS NOTES
Assessment/Plan:  - Viable IUP @ 9w0d  - CEASAR 03-15-23  - Continue PNV  - Call for concerns  - RTO 2 weeks for OB intake       Diagnoses and all orders for this visit:    Amenorrhea  -     POCT urine HCG  -     AMB US OB < 14 weeks single or first gestation level 1  -     Ambulatory Referral to Maternal Fetal Medicine; Future    Nausea  -     ondansetron (Zofran ODT) 8 mg disintegrating tablet; Take 1 tablet (8 mg total) by mouth every 8 (eight) hours as needed for nausea or vomiting    Other orders  -     Prenatal Multivit-Min-Fe-FA (PRE- FORMULA PO); Take by mouth          Subjective:      Patient ID: Ana Bay is a 24 y o  female  Bewily Dangelo is a 26YO  WF presenting to the office for pregnancy confirmation via [address]  Patient reports her LMP as 22, placing her at [address] today  She is very nauseous  The following portions of the patient's history were reviewed and updated as appropriate: allergies, current medications, past family history, past medical history, past social history, past surgical history and problem list     Review of Systems   Constitutional: Negative for chills, fever and unexpected weight change  Respiratory: Negative for shortness of breath  Cardiovascular: Negative for chest pain  Gastrointestinal: Positive for nausea  Negative for abdominal pain, diarrhea and vomiting  Skin: Negative for rash  Psychiatric/Behavioral: Negative for dysphoric mood  The patient is not nervous/anxious  Objective:      /62   Ht 5' 5" (1 651 m)   Wt 95 3 kg (210 lb)   LMP 2022   BMI 34 95 kg/m²          Physical Exam  Vitals reviewed  Constitutional:       Appearance: Normal appearance  She is normal weight  HENT:      Head: Normocephalic and atraumatic  Pulmonary:      Effort: Pulmonary effort is normal    Genitourinary:     General: Normal vulva  Labia:         Right: No rash or lesion  Left: No rash or lesion         Comments: TVUS reveals IUP, yolk sac, fetal pole, +CM  CRL 2 30 cm (9w0d)   BPM  CEASAR 03-15-23  Skin:     General: Skin is warm and dry  Neurological:      General: No focal deficit present  Mental Status: She is alert  Psychiatric:         Mood and Affect: Mood normal          Behavior: Behavior normal            Ultrasound Probe Disinfection    A transvaginal ultrasound was performed     Prior to use, disinfection was performed with High Level Disinfection Process (Trophon)  Probe serial number [id number] was used    Jasmin Martinez PA-C  08/08/22  9:56 AM

## 2022-08-11 ENCOUNTER — INITIAL PRENATAL (OUTPATIENT)
Dept: OBGYN CLINIC | Facility: CLINIC | Age: 27
End: 2022-08-11

## 2022-08-11 VITALS
SYSTOLIC BLOOD PRESSURE: 112 MMHG | BODY MASS INDEX: 34.75 KG/M2 | HEIGHT: 65 IN | DIASTOLIC BLOOD PRESSURE: 68 MMHG | WEIGHT: 208.6 LBS

## 2022-08-11 DIAGNOSIS — Z34.91 ENCOUNTER FOR SUPERVISION OF NORMAL PREGNANCY IN FIRST TRIMESTER, UNSPECIFIED GRAVIDITY: Primary | ICD-10-CM

## 2022-08-11 DIAGNOSIS — Z3A.09 9 WEEKS GESTATION OF PREGNANCY: ICD-10-CM

## 2022-08-11 PROCEDURE — OBC: Performed by: OBSTETRICS & GYNECOLOGY

## 2022-08-11 NOTE — PROGRESS NOTES
OB INTAKE INTERVIEW  Pt presents for OB intake  Plan:  - Prenatal labs ordered   - 1 hour glucose ordered- Mother diabetic  - Referral given for MFM- Nt scheduled for  and Level 2 10/26  - Reviewed Genetic testing options   -SMA and CF offered- Undecided   - Patient to call for concerns  - RTO 3 weeks for OB F/U visit and PAP/Cultures       OB History    Para Term  AB Living   1             SAB IAB Ectopic Multiple Live Births                  # Outcome Date GA Lbr Thee/2nd Weight Sex Delivery Anes PTL Lv   1 Current              Hx of  delivery prior to 36 weeks 6 days: No  Last Menstrual Period:    Patient's last menstrual period was 2022  Ultrasound date: 22  9 weeks  days     Estimated Date of Delivery: 3/15/23    Current Issues:  Constipation :   No  Headaches :   Yes, discussed hydration, high fiber diet, safe medication   Cramping:  No  Spotting :   No      Interview education   [facility] Pregnancy Essentials reviewed and discussed    Baby and Me [facility] Handout   [facility] MFM Handout   Discussed genetic testing   Prenatal lab work: Scripts printed and given to pt   Influenza vaccine given today: No   Discussed Tdap vaccine     Immunizations:   Immunization History   Administered Date(s) Administered    Pulte [facility] vac ([place]-sucrose, gray cap) 12 yr+ IM 2022, 2022    DTaP 5 1997, 1997, 1998, 06/15/1999, 2002    Hep B, adult 1997, 1997, 06/15/1999    Hib (PRP-OMP) 1997, 1997, 1998, 06/15/1999    IPV 1997, 1997, 2002    MMR 1999, 2002    Meningococcal, Unknown Serogroups 2009, 2009    OPV 1999    Tdap 2009, 2009    Tuberculin Skin Test-PPD Intradermal 1998    Varicella 1999       Diabetes              Pregestational DM: No              hx of GDM: No              BMI >35: No              first degree relative with type 2 diabetes:Yes, mother              hx of PCOS: No              current metformin use:  No              prior hx of LGA/macrosomia: No            Hypertension              Hx of chronic HTN: No              hx of gestational HTN: No              hx of preeclampsia, eclampsia, or HELLP syndrome: No              Family h/o preeclampsia: No              Age 28 or older: No              Multifetal gestation: No  Type 1 or Type 2 DM: No  Renal Disease: No  Autoimmune disease (systemic lupus erythematosus, antiphospholipid antibody syndrome): No  Nulliparity: Yes  Obesity (BMI over 30): Yes  More than 10 year pregnancy interval: No  Previous IUGR, low birthweight or small for gestational age: No     Immunizations:              influenza vaccine: Discussed recommendation               discussed Tdap vaccine administration at 27-28 weeks   Covid Vaccination: Received 2 vaccines, unsure if wants booster       Dental visit with last 6 months - Yes  PHQ-2/9 score: 0       MyChart activated (not 1718 years of age)?: Yes    Interviewed by: Reilly Gallagher RN 08/11/22

## 2022-08-15 DIAGNOSIS — R11.0 NAUSEA: ICD-10-CM

## 2022-08-15 RX ORDER — ONDANSETRON 8 MG/1
8 TABLET, ORALLY DISINTEGRATING ORAL EVERY 8 HOURS PRN
Qty: 20 TABLET | Refills: 0 | Status: SHIPPED | OUTPATIENT
Start: 2022-08-15 | End: 2022-08-29 | Stop reason: SDUPTHER

## 2022-08-18 ENCOUNTER — TELEPHONE (OUTPATIENT)
Dept: OBGYN CLINIC | Facility: CLINIC | Age: 27
End: 2022-08-18

## 2022-08-18 NOTE — TELEPHONE ENCOUNTER
Patient called stating that she is having a lot of nausea and vomiting  Her insurance is only allowing her to  9 tablets at a time of the Zofran  We are going to do a prior-auth so we can hopefully get it covered for her  She missed 3 days of work because she ran out of Zofran and was really nauseous  She would like to know if we can put a doctors note in for her?

## 2022-08-23 ENCOUNTER — APPOINTMENT (OUTPATIENT)
Dept: LAB | Facility: MEDICAL CENTER | Age: 27
End: 2022-08-23
Payer: COMMERCIAL

## 2022-08-23 DIAGNOSIS — Z34.91 ENCOUNTER FOR SUPERVISION OF NORMAL PREGNANCY IN FIRST TRIMESTER, UNSPECIFIED GRAVIDITY: ICD-10-CM

## 2022-08-23 DIAGNOSIS — Z3A.09 9 WEEKS GESTATION OF PREGNANCY: ICD-10-CM

## 2022-08-23 LAB
ABO GROUP BLD: NORMAL
BASOPHILS # BLD MANUAL: 0.07 THOUSAND/UL (ref 0–0.1)
BASOPHILS NFR MAR MANUAL: 1 % (ref 0–1)
BLD GP AB SCN SERPL QL: NEGATIVE
EOSINOPHIL # BLD MANUAL: 0 THOUSAND/UL (ref 0–0.4)
EOSINOPHIL NFR BLD MANUAL: 0 % (ref 0–6)
ERYTHROCYTE [DISTWIDTH] IN BLOOD BY AUTOMATED COUNT: 13.9 % (ref 11.6–15.1)
HBV SURFACE AG SER QL: NORMAL
HCT VFR BLD AUTO: 39.3 % (ref 34.8–46.1)
HCV AB SER QL: NORMAL
HGB BLD-MCNC: 12.7 G/DL (ref 11.5–15.4)
LYMPHOCYTES # BLD AUTO: 1.59 THOUSAND/UL (ref 0.6–4.47)
LYMPHOCYTES # BLD AUTO: 22 % (ref 14–44)
MCH RBC QN AUTO: 26.1 PG (ref 26.8–34.3)
MCHC RBC AUTO-ENTMCNC: 32.3 G/DL (ref 31.4–37.4)
MCV RBC AUTO: 81 FL (ref 82–98)
MONOCYTES # BLD AUTO: 0.51 THOUSAND/UL (ref 0–1.22)
MONOCYTES NFR BLD: 7 % (ref 4–12)
NEUTROPHILS # BLD MANUAL: 4.78 THOUSAND/UL (ref 1.85–7.62)
NEUTS SEG NFR BLD AUTO: 66 % (ref 43–75)
NRBC BLD AUTO-RTO: 1 /100 WBC (ref 0–2)
PLATELET # BLD AUTO: 305 THOUSANDS/UL (ref 149–390)
PLATELET BLD QL SMEAR: ADEQUATE
PMV BLD AUTO: 10.2 FL (ref 8.9–12.7)
RBC # BLD AUTO: 4.86 MILLION/UL (ref 3.81–5.12)
RBC MORPH BLD: NORMAL
RH BLD: POSITIVE
RPR SER QL: NORMAL
RUBV IGG SERPL IA-ACNC: 36.9 IU/ML
SPECIMEN EXPIRATION DATE: NORMAL
VARIANT LYMPHS # BLD AUTO: 4 %
VZV IGG SER QL IA: ABNORMAL
WBC # BLD AUTO: 7.24 THOUSAND/UL (ref 4.31–10.16)

## 2022-08-23 PROCEDURE — 36415 COLL VENOUS BLD VENIPUNCTURE: CPT

## 2022-08-23 PROCEDURE — 87086 URINE CULTURE/COLONY COUNT: CPT

## 2022-08-23 PROCEDURE — 86787 VARICELLA-ZOSTER ANTIBODY: CPT

## 2022-08-23 PROCEDURE — 80081 OBSTETRIC PANEL INC HIV TSTG: CPT

## 2022-08-23 PROCEDURE — 86803 HEPATITIS C AB TEST: CPT

## 2022-08-24 LAB
BACTERIA UR CULT: NORMAL
HIV 1+2 AB+HIV1 P24 AG SERPL QL IA: NORMAL

## 2022-08-29 ENCOUNTER — APPOINTMENT (OUTPATIENT)
Dept: LAB | Facility: MEDICAL CENTER | Age: 27
End: 2022-08-29
Payer: COMMERCIAL

## 2022-08-29 DIAGNOSIS — Z34.91 ENCOUNTER FOR SUPERVISION OF NORMAL PREGNANCY IN FIRST TRIMESTER, UNSPECIFIED GRAVIDITY: ICD-10-CM

## 2022-08-29 DIAGNOSIS — Z3A.09 9 WEEKS GESTATION OF PREGNANCY: ICD-10-CM

## 2022-08-29 DIAGNOSIS — R11.0 NAUSEA: ICD-10-CM

## 2022-08-29 LAB — GLUCOSE 1H P 50 G GLC PO SERPL-MCNC: 118 MG/DL (ref 40–134)

## 2022-08-29 PROCEDURE — 82950 GLUCOSE TEST: CPT

## 2022-08-29 PROCEDURE — 36415 COLL VENOUS BLD VENIPUNCTURE: CPT

## 2022-08-29 RX ORDER — ONDANSETRON 8 MG/1
8 TABLET, ORALLY DISINTEGRATING ORAL EVERY 8 HOURS PRN
Qty: 20 TABLET | Refills: 2 | Status: SHIPPED | OUTPATIENT
Start: 2022-08-29

## 2022-08-29 NOTE — TELEPHONE ENCOUNTER
Pt  Requesting refill on zofran  Request routed  Reviewed good rx with pt  For coverage of medication

## 2022-09-07 ENCOUNTER — ROUTINE PRENATAL (OUTPATIENT)
Dept: PERINATAL CARE | Facility: CLINIC | Age: 27
End: 2022-09-07
Payer: COMMERCIAL

## 2022-09-07 VITALS
HEART RATE: 94 BPM | SYSTOLIC BLOOD PRESSURE: 110 MMHG | DIASTOLIC BLOOD PRESSURE: 74 MMHG | BODY MASS INDEX: 32.65 KG/M2 | WEIGHT: 196 LBS | HEIGHT: 65 IN

## 2022-09-07 DIAGNOSIS — N91.2 AMENORRHEA: ICD-10-CM

## 2022-09-07 DIAGNOSIS — Z36.82 ENCOUNTER FOR NUCHAL TRANSLUCENCY TESTING: ICD-10-CM

## 2022-09-07 DIAGNOSIS — O36.80X0 ENCOUNTER TO DETERMINE FETAL VIABILITY OF PREGNANCY, SINGLE OR UNSPECIFIED FETUS: Primary | ICD-10-CM

## 2022-09-07 DIAGNOSIS — Z3A.13 13 WEEKS GESTATION OF PREGNANCY: ICD-10-CM

## 2022-09-07 PROCEDURE — 76813 OB US NUCHAL MEAS 1 GEST: CPT | Performed by: OBSTETRICS & GYNECOLOGY

## 2022-09-07 PROCEDURE — 76801 OB US < 14 WKS SINGLE FETUS: CPT | Performed by: OBSTETRICS & GYNECOLOGY

## 2022-09-07 PROCEDURE — 99241 PR OFFICE CONSULTATION NEW/ESTAB PATIENT 15 MIN: CPT | Performed by: OBSTETRICS & GYNECOLOGY

## 2022-09-07 PROCEDURE — 36415 COLL VENOUS BLD VENIPUNCTURE: CPT | Performed by: OBSTETRICS & GYNECOLOGY

## 2022-09-07 NOTE — PROGRESS NOTES
CONSULT NOTE    Derrick Alex PA-C  [address] [facility] [address]  [address]  [place],  [address] [facility]     Thank you for referring your Huan Napier for a Maternal-Fetal Medicine Consultation:  Below is my consultation  Thank you very much for requesting a consultation this very nice patient for the indication of genetic screening  This is the patient's 1st pregnancy  She has no significant medical or surgical history  She currently takes prenatal vitamins and she has an allergy to penicillins which caused a rash when she was a child  Her substance use history is unremarkable  Family history is significant for a brother who was born with a congenital heart defect  The patient believes that this was a bicuspid valve  We discussed the options for genetic screening, including but not limited to first trimester screening, second trimester screening, combined first and second trimester screening, noninvasive prenatal screening (NIPS) for patients at high risk and diagnostic screening through the use of CVS and amniocentesis  We discussed the risks and benefits of each approach including the sensitivities and false positive rates as well as the difference between a screening test and a diagnostic test   At the conclusion of our discussion the patient elected noninvasive prenatal testing utilizing the [facility] Non-invasive prenatal screening (NIPS) test   The patient had this blood work drawn in the office and the results should be available approximately 7-10 days after her blood draw  Her results will be reported from [facility]  We discussed follow-up in detail and I recommend an anatomy ultrasound be scheduled for 20 weeks gestation  An echocardiogram is recommended at around 22-24 weeks gestation given the 1st degree relative with a congenital heart defect    Thank you very much for allowing us to participate in the care of this very nice patient    Should you have any questions, please do not hesitate to contact our office  Please note, in addition to the time spent discussing the results of the ultrasound, I spent approximately 15 minutes of face-to-face time with the patient, greater than 50% of which was spent in counseling and the coordination of care for this patient  Portions of the record may have been created with voice recognition software  Occasional wrong word or "sound a like" substitutions may have occurred due to the inherent limitations of voice recognition software  Read the chart carefully and recognize, using context, where substitutions have occurred  Tyler Owen MD  Attending Physician, [place]

## 2022-09-07 NOTE — LETTER
September 12, 2022     Rita Tatum PA-C  [address]  [address]  [facility] [address]    Patient: Danilo Rubio   YOB: 1995   Date of Visit: 9/7/2022       Dear Dr Cottrell: Thank you for referring Danilo Rubio to me for evaluation  Below are my notes for this consultation  If you have questions, please do not hesitate to call me  I look forward to following your patient along with you  Sincerely,        Jose Ramon Fair MD        CC: No Recipients  Jose Ramon Fair MD  9/7/2022  8:58 AM  Sign when Signing Visit  CONSULT NOTE    Rita Tatum PA-C  [address]  [address] [address]  [facility],  [address] [place] [address]     Thank you for referring your Danilo Rubio for a Maternal-Fetal Medicine Consultation:  Below is my consultation  Thank you very much for requesting a consultation this very nice patient for the indication of genetic screening  This is the patient's 1st pregnancy  She has no significant medical or surgical history  She currently takes prenatal vitamins and she has an allergy to penicillins which caused a rash when she was a child  Her substance use history is unremarkable  Family history is significant for a brother who was born with a congenital heart defect  The patient believes that this was a bicuspid valve  We discussed the options for genetic screening, including but not limited to first trimester screening, second trimester screening, combined first and second trimester screening, noninvasive prenatal screening (NIPS) for patients at high risk and diagnostic screening through the use of CVS and amniocentesis    We discussed the risks and benefits of each approach including the sensitivities and false positive rates as well as the difference between a screening test and a diagnostic test   At the conclusion of our discussion the patient elected noninvasive prenatal testing utilizing the [facility] Non-invasive prenatal screening (NIPS) test   The patient had this blood work drawn in the office and the results should be available approximately 7-10 days after her blood draw  Her results will be reported from [place]  We discussed follow-up in detail and I recommend an anatomy ultrasound be scheduled for 20 weeks gestation  An echocardiogram is recommended at around 22-24 weeks gestation given the 1st degree relative with a congenital heart defect    Thank you very much for allowing us to participate in the care of this very nice patient  Should you have any questions, please do not hesitate to contact our office  Please note, in addition to the time spent discussing the results of the ultrasound, I spent approximately 15 minutes of face-to-face time with the patient, greater than 50% of which was spent in counseling and the coordination of care for this patient  Portions of the record may have been created with voice recognition software  Occasional wrong word or "sound a like" substitutions may have occurred due to the inherent limitations of voice recognition software  Read the chart carefully and recognize, using context, where substitutions have occurred  Tyler Fortune MD  Attending Physician, [place]

## 2022-09-07 NOTE — PROGRESS NOTES
Patient chose to have [facility] Non-invasive Prenatal Screen  Patient given brochure and is aware [facility] will contact patients insurance and coordinate coverage  Patient made aware she will need to respond to text message or e-mail from [employer] within 2 business days or testing will be run through insurance  Patient informed text message will come from area code  "415"  Provided [address] [place] [address] # [phone] and web site : [email or web address] [email or web address]     2 vials of blood drawn from right arm, patient tolerated blood draw without difficulty  Specimens labeled with patient identifiers (name, date of birth, specimen collection date), order and specimen was verified with patient, packed and sent via [employer]  Copy of lab order scanned to Epic media  Maternal Fetal Medicine will have results in approximately 7-10 business days and will call patient or notify via [address]  Patient aware viewing lab result online will reveal fetal sex If ordered  Patient verbalized understanding of all instructions and no questions at this time         ,

## 2022-09-09 ENCOUNTER — ROUTINE PRENATAL (OUTPATIENT)
Dept: OBGYN CLINIC | Facility: CLINIC | Age: 27
End: 2022-09-09

## 2022-09-09 VITALS — DIASTOLIC BLOOD PRESSURE: 80 MMHG | WEIGHT: 195 LBS | SYSTOLIC BLOOD PRESSURE: 122 MMHG | BODY MASS INDEX: 32.45 KG/M2

## 2022-09-09 DIAGNOSIS — Z3A.13 13 WEEKS GESTATION OF PREGNANCY: Primary | ICD-10-CM

## 2022-09-09 DIAGNOSIS — O21.9 NAUSEA AND VOMITING IN PREGNANCY: ICD-10-CM

## 2022-09-09 DIAGNOSIS — Z34.01 ENCOUNTER FOR SUPERVISION OF NORMAL FIRST PREGNANCY IN FIRST TRIMESTER: ICD-10-CM

## 2022-09-09 PROCEDURE — 87591 N.GONORRHOEAE DNA AMP PROB: CPT | Performed by: OBSTETRICS & GYNECOLOGY

## 2022-09-09 PROCEDURE — PNV: Performed by: OBSTETRICS & GYNECOLOGY

## 2022-09-09 PROCEDURE — G0145 SCR C/V CYTO,THINLAYER,RESCR: HCPCS | Performed by: OBSTETRICS & GYNECOLOGY

## 2022-09-09 PROCEDURE — 87491 CHLMYD TRACH DNA AMP PROBE: CPT | Performed by: OBSTETRICS & GYNECOLOGY

## 2022-09-09 RX ORDER — METOCLOPRAMIDE 10 MG/1
10 TABLET ORAL 4 TIMES DAILY PRN
Qty: 60 TABLET | Refills: 1 | Status: SHIPPED | OUTPATIENT
Start: 2022-09-09

## 2022-09-09 NOTE — PROGRESS NOTES
Pap and gc/chlam ordered  C/o nausea/vomiting and weight loss -states Zofran is not helping   Urine dip neg glucose/trace protein

## 2022-09-09 NOTE — PROGRESS NOTES
24 y o   female at 13w2d (Estimated Date of Delivery: 3/15/23) for PNV  Pre-Briana Vitals    Flowsheet Row Most Recent Value   Prenatal Assessment    Fetal Heart Rate 155   Movement Absent   Prenatal Vitals    Blood Pressure 122/80   Weight - Scale 88 5 kg (195 lb)   Urine Albumin/Glucose    Dilation/Effacement/Station    Vaginal Drainage    Edema    LLE Edema None   RLE Edema None   Facial Edema None        TWG: -6 804 kg (-15 lb)    Patient presents for prenatal H&P  Reports continued nausea/vomiting, taking zofran only  Tolerating fluid intake and bland foods  Reglan Rx provided to help with persistent nausea  Denies vaginal bleeding and abdominal cramping     OB hx reviewed    MFM US performed 22  NIPT elected for genetic screening  AFP ordered today  Reviewed timing of when to have AFP drawn  Last pap 2020 ASCUS, hr HPV pos  GC/Chlamydia collected  Reviewed vaccinations recommended in pregnancy, namely influenza, TDAP, and COVID vaccines  Patient oriented to practice  Reviewed 5 female providers and 2 advanced practitioners all of whom participate in prenatal care  Reviewed recommendations for weight gain in pregnancy  Recommended to limit total weight gain in pregnancy to 11-20lbs  Encouraged moderate intensity exercise throughout pregnancy  Follow up in 4 weeks

## 2022-09-12 ENCOUNTER — TELEPHONE (OUTPATIENT)
Dept: OBGYN CLINIC | Facility: CLINIC | Age: 27
End: 2022-09-12

## 2022-09-12 LAB
C TRACH DNA SPEC QL NAA+PROBE: NEGATIVE
N GONORRHOEA DNA SPEC QL NAA+PROBE: NEGATIVE

## 2022-09-12 NOTE — TELEPHONE ENCOUNTER
Patient called requesting intermittent FMLA  FMLA forms that are filled out are for when she delivers the baby  Patient is having nausea and was switched from Zofran to Reglan on 9/9  Dr John Cormier would like the patient to continue the Reglan to see if she has improvement in her symptoms  If she does not then she should call us and we may consider hydration infusions  Patient agreeable to plan

## 2022-09-14 ENCOUNTER — TELEPHONE (OUTPATIENT)
Dept: OBGYN CLINIC | Facility: CLINIC | Age: 27
End: 2022-09-14

## 2022-09-14 DIAGNOSIS — O21.9 NAUSEA AND VOMITING DURING PREGNANCY: Primary | ICD-10-CM

## 2022-09-14 RX ORDER — POTASSIUM CHLORIDE 14.9 MG/ML
20 INJECTION INTRAVENOUS ONCE
Status: CANCELLED | OUTPATIENT
Start: 2022-09-15

## 2022-09-14 RX ORDER — POTASSIUM CHLORIDE 14.9 MG/ML
20 INJECTION INTRAVENOUS ONCE
OUTPATIENT
Start: 2022-09-15

## 2022-09-14 NOTE — TELEPHONE ENCOUNTER
Spoke to patient on the phone, she states she is still having vomiting despite switching to Reglan   She would like to do the hydration infusions if you can order them for [facility]

## 2022-09-14 NOTE — TELEPHONE ENCOUNTER
Infusion plan ordered for [facility]  Also ordered a CMP lab for her to see how her electrolytes are holding up with her vomiting frequently   We'll need to call to set up her first appointment

## 2022-09-15 ENCOUNTER — TELEPHONE (OUTPATIENT)
Dept: INFUSION CENTER | Facility: CLINIC | Age: 27
End: 2022-09-15

## 2022-09-15 ENCOUNTER — TELEPHONE (OUTPATIENT)
Dept: OBGYN CLINIC | Facility: CLINIC | Age: 27
End: 2022-09-15

## 2022-09-15 LAB
LAB AP GYN PRIMARY INTERPRETATION: NORMAL
Lab: NORMAL

## 2022-09-15 NOTE — TELEPHONE ENCOUNTER
I reached out to patient to get her set up for her hydration infusion  I left a VM for patient to let her know we have an opening at 1:30pm today  I left our call back number and requested a call back

## 2022-09-15 NOTE — TELEPHONE ENCOUNTER
Pt is currently 14 wks preg and would like to transfer her care to our office  And needs to discuss being able to keep liquid down  Was put in Zofran because she lost 15 pounds

## 2022-09-15 NOTE — TELEPHONE ENCOUNTER
Spoke to patient on the phone  Infusion center left a VM on patients phone that they had an apt at 130 today for a hydration infusion  Patient states she did not receive a voicemail from them  I gave the patient the phone number to call them back  She also did not complete the CMP to check to see how dehydrated she is  Patient also is trying to get a second opinion from a different office regarding her nausea and vomiting during pregnancy  Patient was switched from Zofran to Reglan with little improvement and is now ordered hydration infusions to have twice a week  Discussed with patient that FMLA forms can only be filled out to allow her to go to her infusion appointments  Patient currently works nightshift  Patient was also encouraged to reach out to her HR department at work to discuss if taking off work now would interfere with the time off she gets after the baby is born  She states she called HR but they didn't call her back yet

## 2022-09-16 NOTE — TELEPHONE ENCOUNTER
LMOM for patient to return our call  We need to let patient know that unfortunately we do not have any appointments until December which would be to much of a delay in her prenatal care

## 2022-09-20 NOTE — TELEPHONE ENCOUNTER
Soonest available apt offered to pt but encouraged pt if desired to transfer care to keep scheduled apts as not to delay prenatal care  Apologized to pt as pt dissatisfied with soonest offered apt, unfortunately scheduling into end of November to accommodate pt and pt welcome to call and inquire for cancellations to be seen sooner if still desires to transfer care  Pt declines at this time and has no further questions

## 2022-09-21 ENCOUNTER — RA CDI HCC (OUTPATIENT)
Dept: OTHER | Facility: HOSPITAL | Age: 27
End: 2022-09-21

## 2022-09-21 NOTE — PROGRESS NOTES
[facility]       Chart reviewed, no opportunity found: CHART REVIEWED, NO OPPORTUNITY FOUND        Patients Insurance        Commercial Insurance: [address] [place] [address]

## 2022-09-23 ENCOUNTER — ROUTINE PRENATAL (OUTPATIENT)
Dept: OBGYN CLINIC | Facility: CLINIC | Age: 27
End: 2022-09-23
Payer: COMMERCIAL

## 2022-09-23 VITALS — SYSTOLIC BLOOD PRESSURE: 122 MMHG | WEIGHT: 195.8 LBS | BODY MASS INDEX: 32.58 KG/M2 | DIASTOLIC BLOOD PRESSURE: 74 MMHG

## 2022-09-23 DIAGNOSIS — Z23 NEED FOR INFLUENZA VACCINATION: ICD-10-CM

## 2022-09-23 DIAGNOSIS — Z34.02 ENCOUNTER FOR SUPERVISION OF NORMAL FIRST PREGNANCY IN SECOND TRIMESTER: Primary | ICD-10-CM

## 2022-09-23 PROCEDURE — PNV: Performed by: OBSTETRICS & GYNECOLOGY

## 2022-09-23 PROCEDURE — 90686 IIV4 VACC NO PRSV 0.5 ML IM: CPT | Performed by: OBSTETRICS & GYNECOLOGY

## 2022-09-23 PROCEDURE — 90471 IMMUNIZATION ADMIN: CPT | Performed by: OBSTETRICS & GYNECOLOGY

## 2022-09-23 NOTE — PROGRESS NOTES
24 y o   female at 13w2d (Estimated Date of Delivery: 3/15/23) for PNV  Pre-Briana Vitals    Flowsheet Row Most Recent Value   Prenatal Assessment    Movement Absent   Prenatal Vitals    Blood Pressure 122/74   Weight - Scale 88 8 kg (195 lb 12 8 oz)   Urine Albumin/Glucose    Dilation/Effacement/Station    Vaginal Drainage    Edema         TWG: -6 441 kg (-14 lb 3 2 oz)  Pt  States her nausea is finally improving  She did lose 13 lbs her first trimester, but has maintained weight over the past 2 weeks  States the zofran and reglan did not really help  Tolerating po at this time and able to stay hydrated  Requesting intermittent FMLA to cover the time she was off for severe nausea/ vomiting in the first trimester  Pt  Did also ask for a note to return to work, which I filled out  States she has been off since 09/10 because she needed a return to work note       Order msAFP

## 2022-09-23 NOTE — PROGRESS NOTES
Patient states she is feeling well, no complaints  She would eleno the flu vaccine       Urine neg/neg

## 2022-09-23 NOTE — LETTER
September 23, 2022     Patient: Jules Merchant  YOB: 1995  Date of Visit: 9/23/2022      To Whom it May Concern:    Jules Merchant is under my professional care  Claudeen Cull was seen in my office on 9/23/2022  Robertojacqueline Ortega may return to work on 09-  Clear to return without limitation  If you have any questions or concerns, please don't hesitate to call           Sincerely,          Emily Reynolds MD        CC: No Recipients

## 2022-10-14 ENCOUNTER — APPOINTMENT (OUTPATIENT)
Dept: LAB | Facility: MEDICAL CENTER | Age: 27
End: 2022-10-14
Payer: COMMERCIAL

## 2022-10-14 DIAGNOSIS — Z3A.13 13 WEEKS GESTATION OF PREGNANCY: ICD-10-CM

## 2022-10-14 DIAGNOSIS — O21.9 NAUSEA AND VOMITING DURING PREGNANCY: ICD-10-CM

## 2022-10-14 DIAGNOSIS — Z34.02 ENCOUNTER FOR SUPERVISION OF NORMAL FIRST PREGNANCY IN SECOND TRIMESTER: ICD-10-CM

## 2022-10-14 DIAGNOSIS — Z34.01 ENCOUNTER FOR SUPERVISION OF NORMAL FIRST PREGNANCY IN FIRST TRIMESTER: ICD-10-CM

## 2022-10-14 LAB
ALBUMIN SERPL BCP-MCNC: 3.1 G/DL (ref 3.5–5)
ALP SERPL-CCNC: 75 U/L (ref 46–116)
ALT SERPL W P-5'-P-CCNC: 21 U/L (ref 12–78)
ANION GAP SERPL CALCULATED.3IONS-SCNC: 6 MMOL/L (ref 4–13)
AST SERPL W P-5'-P-CCNC: 20 U/L (ref 5–45)
BILIRUB SERPL-MCNC: 0.57 MG/DL (ref 0.2–1)
BUN SERPL-MCNC: 4 MG/DL (ref 5–25)
CALCIUM ALBUM COR SERPL-MCNC: 9.9 MG/DL (ref 8.3–10.1)
CALCIUM SERPL-MCNC: 9.2 MG/DL (ref 8.3–10.1)
CHLORIDE SERPL-SCNC: 107 MMOL/L (ref 96–108)
CO2 SERPL-SCNC: 23 MMOL/L (ref 21–32)
CREAT SERPL-MCNC: 0.49 MG/DL (ref 0.6–1.3)
GFR SERPL CREATININE-BSD FRML MDRD: 135 ML/MIN/1.73SQ M
GLUCOSE P FAST SERPL-MCNC: 82 MG/DL (ref 65–99)
POTASSIUM SERPL-SCNC: 3.9 MMOL/L (ref 3.5–5.3)
PROT SERPL-MCNC: 7.1 G/DL (ref 6.4–8.4)
SODIUM SERPL-SCNC: 136 MMOL/L (ref 135–147)

## 2022-10-14 PROCEDURE — 80053 COMPREHEN METABOLIC PANEL: CPT

## 2022-10-14 PROCEDURE — 36415 COLL VENOUS BLD VENIPUNCTURE: CPT

## 2022-10-14 PROCEDURE — 82105 ALPHA-FETOPROTEIN SERUM: CPT

## 2022-10-16 LAB
2ND TRIMESTER 4 SCREEN SERPL-IMP: NORMAL
AFP ADJ MOM SERPL: 0.96
AFP INTERP AMN-IMP: NORMAL
AFP INTERP SERPL-IMP: NORMAL
AFP INTERP SERPL-IMP: NORMAL
AFP SERPL-MCNC: 38.4 NG/ML
AGE AT DELIVERY: 25.8 YR
GA METHOD: NORMAL
GA: 18.3 WEEKS
IDDM PATIENT QL: NO
MULTIPLE PREGNANCY: NO
NEURAL TUBE DEFECT RISK FETUS: NORMAL %

## 2022-10-25 ENCOUNTER — TELEPHONE (OUTPATIENT)
Dept: PERINATAL CARE | Facility: CLINIC | Age: 27
End: 2022-10-25

## 2022-10-25 NOTE — TELEPHONE ENCOUNTER
Pt called to say that she tested COVID+ last night  Pt began symptoms on 10/23/22  Rescheduled pt's appt to 11/2/22

## 2022-11-02 ENCOUNTER — ROUTINE PRENATAL (OUTPATIENT)
Dept: PERINATAL CARE | Facility: OTHER | Age: 27
End: 2022-11-02

## 2022-11-02 VITALS
HEART RATE: 76 BPM | SYSTOLIC BLOOD PRESSURE: 110 MMHG | DIASTOLIC BLOOD PRESSURE: 64 MMHG | BODY MASS INDEX: 32.06 KG/M2 | WEIGHT: 192.4 LBS | HEIGHT: 65 IN

## 2022-11-02 DIAGNOSIS — Z71.85 VACCINE COUNSELING: ICD-10-CM

## 2022-11-02 DIAGNOSIS — Z82.79 FAMILY HISTORY OF CONGENITAL HEART DISEASE: ICD-10-CM

## 2022-11-02 DIAGNOSIS — Z3A.21 21 WEEKS GESTATION OF PREGNANCY: Primary | ICD-10-CM

## 2022-11-02 DIAGNOSIS — E66.9 OBESITY (BMI 30-39.9): ICD-10-CM

## 2022-11-02 PROBLEM — Z3A.20 20 WEEKS GESTATION OF PREGNANCY: Status: ACTIVE | Noted: 2022-11-02

## 2022-11-02 NOTE — PROGRESS NOTES
A fetal ultrasound was completed  See Ob procedures in Epic for an interpretation and recommendations  Do not hesitate to contact us in [facility] if you have questions  Brea Speaker  Checo Wagner MD, [address] [facility]  Maternal Fetal Medicine

## 2022-11-02 NOTE — LETTER
November 2, 2022     [address] [place] [address]  [address]  [place] [address]    Patient: Ashley Gifford   YOB: 1995   Date of Visit: 11/2/2022       Dear Dr Ryan Given:    Thank you for referring Ashley Gifford to me for evaluation  Below are my notes for this consultation  If you have questions, please do not hesitate to call me  I look forward to following your patient along with you  Sincerely,        Daisy Sr MD        CC: No Recipients  Daisy Sr MD  11/2/2022  2:38 PM  Sign when Signing Visit  A fetal ultrasound was completed  See Ob procedures in Epic for an interpretation and recommendations  Do not hesitate to contact us in [facility] if you have questions  Rosie Cutler MD, [address] [facility]  Maternal Fetal Medicine

## 2022-11-02 NOTE — PROGRESS NOTES
Ultrasound Probe Disinfection    A transvaginal ultrasound was performed  Prior to use, disinfection was performed with High Level Disinfection Process (Trophon)  Probe serial number [id number]: [id number] was used        Savannah April  MS  11/02/22  1:37 PM

## 2022-11-15 ENCOUNTER — ROUTINE PRENATAL (OUTPATIENT)
Dept: PEDIATRIC CARDIOLOGY | Facility: CLINIC | Age: 27
End: 2022-11-15

## 2022-11-15 VITALS
BODY MASS INDEX: 32.47 KG/M2 | HEIGHT: 65 IN | DIASTOLIC BLOOD PRESSURE: 72 MMHG | HEART RATE: 82 BPM | WEIGHT: 194.89 LBS | SYSTOLIC BLOOD PRESSURE: 116 MMHG

## 2022-11-15 DIAGNOSIS — Z36.89 NORMAL FETAL CARDIAC EXAM: Primary | ICD-10-CM

## 2022-11-15 NOTE — PROGRESS NOTES
Fetal Cardiology Consult    Date of Visit:    11/15/2022  Gestational Age:  22w6d   Estimated Date of Delivery:    3/15/23     I had the opportunity to meet with Aileen Mustafa today for a Fetal Cardiology Consult and Fetal Echocardiogram  The reason for consultation was family history of bicuspid aortic valve  The Fetal Echocardiogram demonstrated normal cardiac anatomy and function (see full report under OB procedures)  I reviewed the normal findings  We also discussed, that due to the technical limitations of fetal echocardiography and the nature of fetal circulation, a number of cardiovascular defects cannot be definitively ruled out at this stage  Examples include but are not limited to: ASD, PDA, small VSD, coarctation of the aorta, partial anomalous pulmonary venous connection  Assessment and Recommendations:  -Normal fetal cardiac anatomy and function    -Normal  care and prenatal care are recommended     -There is no follow-up needed  Thank you for allowing me to participate in Teetee's care  Feel free to contact me with questions  I spent 60minutes - on the day of service - reviewing the patient's chart, reviewing previous imaging studies, counseling the patient about the fetal findings, coordinating care, and documenting care  Dejuan Arreola MD  Pediatric Cardiology  [address] [place] [address]  Fax: [phone]  [email or web address]

## 2022-11-29 ENCOUNTER — ROUTINE PRENATAL (OUTPATIENT)
Dept: OBGYN CLINIC | Facility: CLINIC | Age: 27
End: 2022-11-29

## 2022-11-29 ENCOUNTER — ULTRASOUND (OUTPATIENT)
Dept: PERINATAL CARE | Facility: OTHER | Age: 27
End: 2022-11-29

## 2022-11-29 VITALS
WEIGHT: 195 LBS | DIASTOLIC BLOOD PRESSURE: 76 MMHG | BODY MASS INDEX: 32.49 KG/M2 | SYSTOLIC BLOOD PRESSURE: 118 MMHG | HEART RATE: 85 BPM | HEIGHT: 65 IN

## 2022-11-29 VITALS — BODY MASS INDEX: 32.28 KG/M2 | DIASTOLIC BLOOD PRESSURE: 60 MMHG | SYSTOLIC BLOOD PRESSURE: 110 MMHG | WEIGHT: 194 LBS

## 2022-11-29 DIAGNOSIS — O99.212 MATERNAL OBESITY, ANTEPARTUM, SECOND TRIMESTER: ICD-10-CM

## 2022-11-29 DIAGNOSIS — M54.9 BACK PAIN AFFECTING PREGNANCY IN SECOND TRIMESTER: ICD-10-CM

## 2022-11-29 DIAGNOSIS — Z3A.24 24 WEEKS GESTATION OF PREGNANCY: Primary | ICD-10-CM

## 2022-11-29 DIAGNOSIS — Z36.89 ENCOUNTER FOR FETAL ANATOMIC SURVEY: ICD-10-CM

## 2022-11-29 DIAGNOSIS — O99.891 BACK PAIN AFFECTING PREGNANCY IN SECOND TRIMESTER: ICD-10-CM

## 2022-11-29 NOTE — LETTER
November 29, 2022     Paul Lyles MD  [address] [facility]  [address]  [facility] [address]    Patient: María Garcia   YOB: 1995   Date of Visit: 11/29/2022       Dear Dr Catrachito Price:    Thank you for referring María Garcia to me for evaluation  Below are my notes for this consultation  If you have questions, please do not hesitate to call me  I look forward to following your patient along with you  Sincerely,        Reji Russell MD        CC: No Recipients  Reji Russell MD  11/28/2022  7:15 PM  Sign when Signing Visit  Please refer to the [facility] ultrasound report in Ob Procedures for additional information regarding today's visit

## 2022-11-29 NOTE — PROGRESS NOTES
24 y o   female at 18w6d (Estimated Date of Delivery: 3/15/23) for PNV  Pre-Briana Vitals    Flowsheet Row Most Recent Value   Prenatal Assessment    Fetal Heart Rate 135   Movement Present   Prenatal Vitals    Blood Pressure 110/60   Weight - Scale 88 kg (194 lb)   Urine Albumin/Glucose    Dilation/Effacement/Station    Vaginal Drainage    Edema    LLE Edema None   RLE Edema None   Facial Edema None        TWG: -7 258 kg (-16 lb)  Denies cramping/lof or bleeding  Good FM  It's a GIRL! Normal fetal echo  F/U anatomy scan today  Discussed back pain, works in a [employer]  Discussed support belt at work, referral to physical therapy  28wk labs ordered  RTO in 4 weeks

## 2022-11-29 NOTE — PROGRESS NOTES
Pt given labs for next trimester  Pt is complaining of back pain today that she wants to discuss  Neg urine dip

## 2022-11-29 NOTE — PATIENT INSTRUCTIONS
Duplicate request, previously addressed. Patient has refills on file. norgestrel-ethinyl estradiol (LOW-OGESTREL) 0.3-30 MG-MCG Oral Tab 3 Package 4 11/14/2019    Sig:   Take 1 tablet by mouth daily.      Route:   Oral     Order #: [id number] Randy bailey    Physical Therapy at [facility] team of trained female therapists offer a wide variety of services designed to address the special healthcare needs of women  Our specially trained clinicians work with you to address your concerns and come beside you to support and encourage you through the healing process  Our offices are designed to keep your sensitive treatments private at all times  We are here to ensure your comfort and mentor you through our pelvic floor therapy programs at your pace  Our female team of experts treat the following conditions faced by women:      Urinary or bowel incontinence  Urinary urgency or frequency  Painful [place]  Painful Bladder Syndrome  Overactive Bladder  Constipation  Pelvic Girdle Pain             Sacroiliac Dysfunction   Gynecological Cancers    Pregnancy & Postpartum related discomfort  Buttock/Tailbone Pain                       Lumbar/Thoracic Pain  Hip Abnormalities/Pain    Scar Adhesions  Diastasis Recti  Osteoporosis          /Episiotomy Recovery  Vulvodynia  Pelvic Pain      You are due for your next set of labs  We request that you go to the lab to have your blood work done between 26-28 weeks  Labs ordered include a blood count to check for anemia, a syphilis test which is state mandated and the 1 hour glucose test to screen for diabetes in pregnancy  If you are on medication for hypothyroidism, we will also check a thyroid level (TSH)  If your blood type is RH negative, we will also check a type and screen and will recommend a Rhogam injection at your 28 week visit  Glucose Testing Instructions  One-hour glucose tolerance test:    The test should be done at 26-28 weeks pregnant, unless otherwise directed by your physician  Normally you should be avoiding sugars during pregnancy   Eat normally prior to the test   You do not need fast  Once you have finished the glucola do NOT EAT or DRINK anything until your blood is drawn  The glucola will be given to you by the   You will be required to wait at the lab for one hour after drinking the glucola  After one hour, your blood will be drawn  If the test is normal, it will be reported via [facility]  If it is abnormal, we will notify you and a 3 hour glucose test will be recommended

## 2022-12-16 ENCOUNTER — APPOINTMENT (OUTPATIENT)
Dept: LAB | Facility: MEDICAL CENTER | Age: 27
End: 2022-12-16

## 2022-12-16 DIAGNOSIS — Z3A.24 24 WEEKS GESTATION OF PREGNANCY: ICD-10-CM

## 2022-12-16 LAB
ERYTHROCYTE [DISTWIDTH] IN BLOOD BY AUTOMATED COUNT: 14.2 % (ref 11.6–15.1)
GLUCOSE 1H P 50 G GLC PO SERPL-MCNC: 88 MG/DL (ref 40–134)
HCT VFR BLD AUTO: 32.5 % (ref 34.8–46.1)
HGB BLD-MCNC: 10.5 G/DL (ref 11.5–15.4)
MCH RBC QN AUTO: 27.5 PG (ref 26.8–34.3)
MCHC RBC AUTO-ENTMCNC: 32.3 G/DL (ref 31.4–37.4)
MCV RBC AUTO: 85 FL (ref 82–98)
PLATELET # BLD AUTO: 268 THOUSANDS/UL (ref 149–390)
PMV BLD AUTO: 10.2 FL (ref 8.9–12.7)
RBC # BLD AUTO: 3.82 MILLION/UL (ref 3.81–5.12)
RPR SER QL: NORMAL
WBC # BLD AUTO: 9.74 THOUSAND/UL (ref 4.31–10.16)

## 2022-12-19 NOTE — RESULT ENCOUNTER NOTE
Congratulations! You have passed your glucose test      Your most recent blood work shows that you are anemic  Please take over the counter iron once every other day  Iron Sulfate 325mg is one option  Take with orange juice to help increase absorption  You shouldn't take iron supplements with milk, caffeine, antacids, or calcium supplements as it may decrease absorption  Please contact the office with any questions  Please contact the office at [phone] with any questions

## 2022-12-20 ENCOUNTER — ROUTINE PRENATAL (OUTPATIENT)
Dept: OBGYN CLINIC | Facility: CLINIC | Age: 27
End: 2022-12-20

## 2022-12-20 VITALS — SYSTOLIC BLOOD PRESSURE: 114 MMHG | DIASTOLIC BLOOD PRESSURE: 62 MMHG | WEIGHT: 196 LBS | BODY MASS INDEX: 32.62 KG/M2

## 2022-12-20 DIAGNOSIS — Z3A.27 27 WEEKS GESTATION OF PREGNANCY: ICD-10-CM

## 2022-12-20 DIAGNOSIS — O21.9 NAUSEA AND VOMITING DURING PREGNANCY: Primary | ICD-10-CM

## 2022-12-20 PROBLEM — J34.89 NASAL OBSTRUCTION: Status: RESOLVED | Noted: 2022-05-31 | Resolved: 2022-12-20

## 2022-12-20 LAB
DME PARACHUTE DELIVERY DATE REQUESTED: NORMAL
DME PARACHUTE ITEM DESCRIPTION: NORMAL
DME PARACHUTE ORDER STATUS: NORMAL
DME PARACHUTE SUPPLIER NAME: NORMAL
DME PARACHUTE SUPPLIER PHONE: NORMAL

## 2022-12-20 NOTE — PROGRESS NOTES
24 y o    female at 27 5 wga for PNV  BP : 114/62  TWG: -14    Glucola, RPR and CBC reviewed today  RhoGam needed No  Tdap needed Yes - next visit  Flu vaccine - given  FKC reviewed  28 week booklet, Baby and Me and birth plan given and reviewed today     Consent signed, full code, ok with blood transfusion  Delayed cord clamping, skin to skin, rooming in and breast feeding reviewed  F/u 2 weeks

## 2022-12-20 NOTE — PROGRESS NOTES
Red folder due today  TDAP offered - deferred until next visit  Breast pump submitted  Patient states she is doing well, she has no concerns

## 2022-12-27 ENCOUNTER — TELEPHONE (OUTPATIENT)
Dept: OBGYN CLINIC | Facility: CLINIC | Age: 27
End: 2022-12-27

## 2022-12-27 NOTE — TELEPHONE ENCOUNTER
Pt  Calls to report about 6 movements in last hour from baby  Advised to change positions, eat small snack, drink ice cold water and continue counting movements for next hour  If you do not reach 10 total movements by 2nd hour call back right away  Pt  Vianney Shea understanding

## 2022-12-29 ENCOUNTER — NURSE TRIAGE (OUTPATIENT)
Dept: OTHER | Facility: OTHER | Age: 27
End: 2022-12-29

## 2022-12-30 ENCOUNTER — TELEPHONE (OUTPATIENT)
Dept: OBGYN CLINIC | Facility: CLINIC | Age: 27
End: 2022-12-30

## 2022-12-30 NOTE — TELEPHONE ENCOUNTER
Patient called, she reports that she is 29 weeks pregnant and is having cough, congestion and fevers  She is taking tylenol as needed for the fevers  Dicussed safe use of plain robitussin, mucinex, tylenol cold and sinus and sleeping with a humidifier   She should call her PCP to see if they want to screen for the Flu/Covid

## 2022-12-30 NOTE — TELEPHONE ENCOUNTER
Regarding: Girlfriend 29 weeks pregnanat fever of 101 4 took tylenol about 30 mint but before fever was 102 1  ----- Message from Radha Perry sent at 12/29/2022 10:19 PM EST -----  '' My girlfriend is 29 weeks pregnant she is having flu like symptoms for about 3 day cough, running nose, chills and a fever 101 4 she took tylenol about 30 minutes ago before her fever was 102 1 ''

## 2022-12-30 NOTE — TELEPHONE ENCOUNTER
Reason for Disposition  • [1] Influenza EXPOSURE (Close Contact) within last 48 hours (2 days) AND [2] exposed person is HIGH RISK (e g , age > 59 years, pregnant, HIV+, chronic medical condition)    Answer Assessment - Initial Assessment Questions  1  TYPE of EXPOSURE: "How were you exposed?" (e g , close contact, not a close contact)      Family over holidays  2  DATE of EXPOSURE: "When did the exposure occur?" (e g , hour, days, weeks)      [place]  3  PREGNANCY: "Is there any chance you are pregnant?" "When was your last menstrual period?"      29 weeks pregnant 3/15  4  HIGH RISK for COMPLICATIONS: "Do you have any heart or lung problems?" "Do you have a weakened immune system?" (e g , CHF, COPD, asthma, HIV positive, chemotherapy, renal failure, diabetes mellitus, sickle cell anemia)      Denies  5  SYMPTOMS: "Do you have any symptoms?" (e g , cough, fever, sore throat, difficulty breathing)        Fever for 3 days 101 4; cough, runny nose, chills; denies shortness of breath    Protocols used: INFLUENZA EXPOSURE-ADULT-

## 2023-01-03 ENCOUNTER — ROUTINE PRENATAL (OUTPATIENT)
Dept: OBGYN CLINIC | Facility: CLINIC | Age: 28
End: 2023-01-03

## 2023-01-03 ENCOUNTER — HOSPITAL ENCOUNTER (EMERGENCY)
Facility: HOSPITAL | Age: 28
Discharge: HOME | End: 2023-01-03
Attending: EMERGENCY MEDICINE | Admitting: OBSTETRICS & GYNECOLOGY

## 2023-01-03 VITALS
TEMPERATURE: 98.7 F | OXYGEN SATURATION: 97 % | HEART RATE: 78 BPM | SYSTOLIC BLOOD PRESSURE: 96 MMHG | RESPIRATION RATE: 16 BRPM | DIASTOLIC BLOOD PRESSURE: 56 MMHG

## 2023-01-03 VITALS — WEIGHT: 194 LBS | BODY MASS INDEX: 32.28 KG/M2

## 2023-01-03 DIAGNOSIS — O21.9 NAUSEA AND VOMITING IN PREGNANCY: ICD-10-CM

## 2023-01-03 DIAGNOSIS — Z3A.29 29 WEEKS GESTATION OF PREGNANCY: ICD-10-CM

## 2023-01-03 DIAGNOSIS — O21.9 NAUSEA AND VOMITING DURING PREGNANCY: Primary | ICD-10-CM

## 2023-01-03 DIAGNOSIS — R10.9 ABDOMINAL PAIN: ICD-10-CM

## 2023-01-03 DIAGNOSIS — K92.0 HEMATEMESIS: Primary | ICD-10-CM

## 2023-01-03 LAB
ABO GROUP BLD: NORMAL
ALBUMIN SERPL BCP-MCNC: 3.3 G/DL (ref 3.5–5)
ALP SERPL-CCNC: 108 U/L (ref 34–104)
ALT SERPL W P-5'-P-CCNC: 10 U/L (ref 7–52)
ANION GAP SERPL CALCULATED.3IONS-SCNC: 8 MMOL/L (ref 4–13)
APTT PPP: 27 SECONDS (ref 23–37)
AST SERPL W P-5'-P-CCNC: 18 U/L (ref 13–39)
ATRIAL RATE: 82 BPM
BASOPHILS # BLD AUTO: 0.02 THOUSANDS/ÂΜL (ref 0–0.1)
BASOPHILS NFR BLD AUTO: 0 % (ref 0–1)
BILIRUB SERPL-MCNC: 0.33 MG/DL (ref 0.2–1)
BILIRUB UR QL STRIP: NEGATIVE
BLD GP AB SCN SERPL QL: NEGATIVE
BUN SERPL-MCNC: 4 MG/DL (ref 5–25)
CALCIUM ALBUM COR SERPL-MCNC: 9.1 MG/DL (ref 8.3–10.1)
CALCIUM SERPL-MCNC: 8.5 MG/DL (ref 8.4–10.2)
CHLORIDE SERPL-SCNC: 104 MMOL/L (ref 96–108)
CLARITY UR: NORMAL
CO2 SERPL-SCNC: 22 MMOL/L (ref 21–32)
COLOR UR: NORMAL
CREAT SERPL-MCNC: 0.28 MG/DL (ref 0.6–1.3)
EOSINOPHIL # BLD AUTO: 0.04 THOUSAND/ÂΜL (ref 0–0.61)
EOSINOPHIL NFR BLD AUTO: 0 % (ref 0–6)
ERYTHROCYTE [DISTWIDTH] IN BLOOD BY AUTOMATED COUNT: 13.3 % (ref 11.6–15.1)
FLUAV RNA RESP QL NAA+PROBE: NEGATIVE
FLUBV RNA RESP QL NAA+PROBE: NEGATIVE
GFR SERPL CREATININE-BSD FRML MDRD: 163 ML/MIN/1.73SQ M
GLUCOSE SERPL-MCNC: 84 MG/DL (ref 65–140)
GLUCOSE UR STRIP-MCNC: NEGATIVE MG/DL
HCT VFR BLD AUTO: 30.5 % (ref 34.8–46.1)
HGB BLD-MCNC: 10 G/DL (ref 11.5–15.4)
HGB UR QL STRIP.AUTO: NEGATIVE
IMM GRANULOCYTES # BLD AUTO: 0.21 THOUSAND/UL (ref 0–0.2)
IMM GRANULOCYTES NFR BLD AUTO: 2 % (ref 0–2)
INR PPP: 1.01 (ref 0.84–1.19)
KETONES UR STRIP-MCNC: NEGATIVE MG/DL
LEUKOCYTE ESTERASE UR QL STRIP: NEGATIVE
LIPASE SERPL-CCNC: 32 U/L (ref 11–82)
LYMPHOCYTES # BLD AUTO: 2.67 THOUSANDS/ÂΜL (ref 0.6–4.47)
LYMPHOCYTES NFR BLD AUTO: 23 % (ref 14–44)
MCH RBC QN AUTO: 27 PG (ref 26.8–34.3)
MCHC RBC AUTO-ENTMCNC: 32.8 G/DL (ref 31.4–37.4)
MCV RBC AUTO: 82 FL (ref 82–98)
MONOCYTES # BLD AUTO: 0.74 THOUSAND/ÂΜL (ref 0.17–1.22)
MONOCYTES NFR BLD AUTO: 6 % (ref 4–12)
NEUTROPHILS # BLD AUTO: 7.98 THOUSANDS/ÂΜL (ref 1.85–7.62)
NEUTS SEG NFR BLD AUTO: 69 % (ref 43–75)
NITRITE UR QL STRIP: NEGATIVE
NRBC BLD AUTO-RTO: 0 /100 WBCS
P AXIS: 37 DEGREES
PH UR STRIP.AUTO: 6 [PH]
PLATELET # BLD AUTO: 244 THOUSANDS/UL (ref 149–390)
PMV BLD AUTO: 9.6 FL (ref 8.9–12.7)
POTASSIUM SERPL-SCNC: 3.5 MMOL/L (ref 3.5–5.3)
PR INTERVAL: 124 MS
PROT SERPL-MCNC: 6.6 G/DL (ref 6.4–8.4)
PROT UR STRIP-MCNC: NEGATIVE MG/DL
PROTHROMBIN TIME: 13.5 SECONDS (ref 11.6–14.5)
QRS AXIS: 59 DEGREES
QRSD INTERVAL: 94 MS
QT INTERVAL: 412 MS
QTC INTERVAL: 481 MS
RBC # BLD AUTO: 3.7 MILLION/UL (ref 3.81–5.12)
RH BLD: POSITIVE
RSV RNA RESP QL NAA+PROBE: NEGATIVE
SARS-COV-2 RNA RESP QL NAA+PROBE: NEGATIVE
SODIUM SERPL-SCNC: 134 MMOL/L (ref 135–147)
SP GR UR STRIP.AUTO: 1 (ref 1–1.03)
SPECIMEN EXPIRATION DATE: NORMAL
T WAVE AXIS: 29 DEGREES
UROBILINOGEN UR STRIP-ACNC: <2 MG/DL
VENTRICULAR RATE: 82 BPM
WBC # BLD AUTO: 11.66 THOUSAND/UL (ref 4.31–10.16)

## 2023-01-03 RX ORDER — FERROUS SULFATE TAB EC 324 MG (65 MG FE EQUIVALENT) 324 (65 FE) MG
324 TABLET DELAYED RESPONSE ORAL EVERY OTHER DAY
Qty: 45 TABLET | Refills: 0 | Status: SHIPPED | OUTPATIENT
Start: 2023-01-03 | End: 2023-04-03

## 2023-01-03 NOTE — ED NOTES
Per [facility], see patient for vomiting/coughing up blood in ED then send to L&D for further evemerald Coles RN  01/03/23 [id number]

## 2023-01-03 NOTE — ED NOTES
ED providers made aware that patient is 30 weeks pregnant and wants to be evaluated in ED prior to being sent upstairs        [address] [facility], [facility]  01/03/23 1144

## 2023-01-03 NOTE — PROCEDURES
Maris Olivares, a  at 95C9L with an CEASAR of 3/15/2023, by Last Menstrual Period, was seen at [address] for the following procedure(s): $Procedure Type: US - Transvaginal]                   Ultrasound Other  Cervical Length: 2 83         Ultrasound Probe Disinfection    A transvaginal ultrasound was performed     Prior to use, disinfection was performed with High Level Disinfection Process (Trophon)  Sharmin Grimm MD  23  8:34 AM

## 2023-01-03 NOTE — ED ATTENDING ATTESTATION
1/3/2023  IKaylan MD, saw and evaluated the patient  I have discussed the patient with the resident/non-physician practitioner and agree with the resident's/non-physician practitioner's findings, Plan of Care, and MDM as documented in the resident's/non-physician practitioner's note, except where noted  All available labs and Radiology studies were reviewed  I was present for key portions of any procedure(s) performed by the resident/non-physician practitioner and I was immediately available to provide assistance  At this point I agree with the current assessment done in the Emergency Department  I have conducted an independent evaluation of this patient a history and physical is as follows:    ED Course         Critical Care Time  Procedures    Patient is a pleasant 24 yof with an episode of vomiting  1 blood streaked vomiting episode  Currently pregnant  + abdominal cramping   in baby  No vaginal discharge  MDM doubt acute intraabdominal process, will defer form CT scan, suspect prudencio freedman, will transfer to Ob for fetal monitoring

## 2023-01-03 NOTE — ED PROVIDER NOTES
History  Chief Complaint   Patient presents with   • Vomiting During Pregnancy     Pt is 30 weeks pregnant, was at work tonight when she states she was coughing/vomiting blood  Pt also reports she is having increased abdominal pressure  OB aware, wants to evaluate pt in ED first then send to [facility] for further eval     68-year-old female, , 30 weeks gestation presenting to the ED due to blood-streaked vomit which started tonight  Patient states she works overnight at the [employer], was at work when she got a dry coughing episode  Patient then had blood-streaked vomiting for 5 minutes straight  Also reporting lower abdominal cramping which is worse than usual which started around the same time as the vomiting  Denies hemoptysis  No vaginal bleeding or discharge  Denies chest pain, shortness of breath, fevers, chills, urinary symptoms  MDM: Labs grossly unremarkable for gestational age  UA negative  Coags normal   Bedside transabdominal ultrasound completed in the ED, revealed fetal heart rate of 122 bpm   Discussed case with OB/GYN senior resident, patient okay to send to L&D for monitoring after medically cleared in the ED  Patient medically cleared to send to L&D  Prior to Admission Medications   Prescriptions Last Dose Informant Patient Reported? Taking?    Prenatal Multivit-Min-Fe-FA (PRE-SUJATHA FORMULA PO) 2023  Yes Yes   Sig: Take by mouth      Facility-Administered Medications: None       Past Medical History:   Diagnosis Date   • Anxiety     Last assessed 2017    • Bursitis    • Depression     Last assessed 2017    • Fatigue     Last assessed 2017        Past Surgical History:   Procedure Laterality Date   • RHINOPLASTY      deviated septum   • WISDOM TOOTH EXTRACTION         Family History   Problem Relation Age of Onset   • Diabetes Mother         type 2 diabetes   • No Known Problems Father    • No Known Problems Brother    • Heart defect Brother      I have reviewed and agree with the history as documented  E-Cigarette/Vaping   • E-Cigarette Use Never User      E-Cigarette/Vaping Substances     Social History     Tobacco Use   • Smoking status: Never   • Smokeless tobacco: Never   Vaping Use   • Vaping Use: Never used   Substance Use Topics   • Alcohol use: Not Currently   • Drug use: No        Review of Systems   Constitutional: Negative for chills and fever  HENT: Negative for ear pain and sore throat  Eyes: Negative for pain and visual disturbance  Respiratory: Negative for cough and shortness of breath  Cardiovascular: Negative for chest pain and palpitations  Gastrointestinal: Positive for abdominal pain and vomiting  Genitourinary: Negative for dysuria and hematuria  Musculoskeletal: Negative for arthralgias and back pain  Skin: Negative for color change and rash  Neurological: Negative for seizures and syncope  All other systems reviewed and are negative  Physical Exam  ED Triage Vitals [01/03/23 0454]   Temperature Pulse Respirations Blood Pressure SpO2   98 7 °F (37 1 °C) 94 20 112/66 98 %      Temp Source Heart Rate Source Patient Position - Orthostatic VS BP Location FiO2 (%)   Oral Monitor Sitting Right arm --      Pain Score       --             Orthostatic Vital Signs  Vitals:    01/03/23 0454 01/03/23 0530 01/03/23 0600 01/03/23 0740   BP: 112/66 105/58 105/58 96/56   Pulse: 94 81 80 78   Patient Position - Orthostatic VS: Sitting   Lying       Physical Exam  Vitals and nursing note reviewed  Constitutional:       General: She is not in acute distress  Appearance: She is well-developed  HENT:      Head: Normocephalic and atraumatic  Eyes:      Conjunctiva/sclera: Conjunctivae normal    Cardiovascular:      Rate and Rhythm: Normal rate and regular rhythm  Pulses:           Radial pulses are 2+ on the right side and 2+ on the left side          Dorsalis pedis pulses are 2+ on the right side and 2+ on the left side       Heart sounds: Normal heart sounds, S1 normal and S2 normal  No murmur heard  Pulmonary:      Effort: Pulmonary effort is normal  No respiratory distress  Breath sounds: Normal breath sounds and air entry  Abdominal:      Palpations: Abdomen is soft  Tenderness: There is no abdominal tenderness  Musculoskeletal:         General: No swelling  Cervical back: Neck supple  Right lower leg: No edema  Left lower leg: No edema  Skin:     General: Skin is warm and dry  Capillary Refill: Capillary refill takes less than 2 seconds  Neurological:      Mental Status: She is alert  Psychiatric:         Mood and Affect: Mood normal          ED Medications  Medications - No data to display    Diagnostic Studies  Results Reviewed     Procedure Component Value Units Date/Time    COVID/FLU/RSV [[id number]]  (Normal) Collected: 01/03/23 0713    Lab Status: Final result Specimen: Nares from Nose Updated: 01/03/23 0756     SARS-CoV-2 Negative     INFLUENZA A PCR Negative     INFLUENZA B PCR Negative     RSV PCR Negative    Narrative:      FOR PEDIATRIC PATIENTS - copy/paste COVID Guidelines URL to browser: [email or web address]  [employer]    SARS-CoV-2 assay is a Nucleic Acid Amplification assay intended for the  qualitative detection of nucleic acid from SARS-CoV-2 in nasopharyngeal  swabs  Results are for the presumptive identification of SARS-CoV-2 RNA  Positive results are indicative of infection with SARS-CoV-2, the virus  causing COVID-19, but do not rule out bacterial infection or co-infection  with other viruses  Laboratories within the [place] and its  territories are required to report all positive results to the appropriate  public health authorities  Negative results do not preclude SARS-CoV-2  infection and should not be used as the sole basis for treatment or other  patient management decisions   Negative results must be combined with  clinical observations, patient history, and epidemiological information  This test has not been FDA cleared or approved  This test has been authorized by FDA under an Emergency Use Authorization  (EUA)  This test is only authorized for the duration of time the  declaration that circumstances exist justifying the authorization of the  emergency use of an in vitro diagnostic tests for detection of SARS-CoV-2  virus and/or diagnosis of COVID-19 infection under section 564(b)(1) of  the Act, 21 U  S C  226DXU-9(Q)(5), unless the authorization is terminated  or revoked sooner  The test has been validated but independent review by FDA  and CLIA is pending  Test performed using [employer] GeneXpert: This RT-PCR assay targets N2,  a region unique to SARS-CoV-2  A conserved region in the E-gene was chosen  for pan-Sarbecovirus detection which includes SARS-CoV-2  According to CMS-2020-01-R, this platform meets the definition of high-throughput technology  Protime-INR [[id number]]  (Normal) Collected: 01/03/23 0713    Lab Status: Final result Specimen: Blood from Arm, Right Updated: 01/03/23 0749     Protime 13 5 seconds      INR 1 01    APTT [[id number]]  (Normal) Collected: 01/03/23 0713    Lab Status: Final result Specimen: Blood from Arm, Right Updated: 01/03/23 0749     PTT 27 seconds     UA w Reflex to Microscopic w Reflex to Culture [[id number]] Collected: 01/03/23 0653    Lab Status: Final result Specimen: Urine, Clean Catch Updated: 01/03/23 0703     Color, UA Light Yellow     Clarity, UA Turbid     Specific [place], UA 1 004     pH, UA 6 0     Leukocytes, UA Negative     Nitrite, UA Negative     Protein, UA Negative mg/dl      Glucose, UA Negative mg/dl      Ketones, UA Negative mg/dl      Urobilinogen, UA <2 0 mg/dl      Bilirubin, UA Negative     Occult Blood, UA Negative     URINE COMMENT --    Urine culture [[id number]] Collected: 01/03/23 0653    Lab Status:  In process Specimen: Urine, Clean Catch Updated: 01/03/23 0703    Comprehensive metabolic panel [[id number]]  (Abnormal) Collected: 01/03/23 0519    Lab Status: Final result Specimen: Blood from Arm, Right Updated: 01/03/23 0554     Sodium 134 mmol/L      Potassium 3 5 mmol/L      Chloride 104 mmol/L      CO2 22 mmol/L      ANION GAP 8 mmol/L      BUN 4 mg/dL      Creatinine 0 28 mg/dL      Glucose 84 mg/dL      Calcium 8 5 mg/dL      Corrected Calcium 9 1 mg/dL      AST 18 U/L      ALT 10 U/L      Alkaline Phosphatase 108 U/L      Total Protein 6 6 g/dL      Albumin 3 3 g/dL      Total Bilirubin 0 33 mg/dL      eGFR 163 ml/min/1 73sq m     Narrative:      National Kidney Disease Foundation guidelines for Chronic Kidney Disease (CKD):   •  Stage 1 with normal or high GFR (GFR > 90 mL/min/1 73 square meters)  •  Stage 2 Mild CKD (GFR = 60-89 mL/min/1 73 square meters)  •  Stage 3A Moderate CKD (GFR = 45-59 mL/min/1 73 square meters)  •  Stage 3B Moderate CKD (GFR = 30-44 mL/min/1 73 square meters)  •  Stage 4 Severe CKD (GFR = 15-29 mL/min/1 73 square meters)  •  Stage 5 End Stage CKD (GFR <15 mL/min/1 73 square meters)  Note: GFR calculation is accurate only with a steady state creatinine    Lipase [[id number]]  (Normal) Collected: 01/03/23 0519    Lab Status: Final result Specimen: Blood from Arm, Right Updated: 01/03/23 0554     Lipase 32 u/L     CBC and differential [[id number]]  (Abnormal) Collected: 01/03/23 0519    Lab Status: Final result Specimen: Blood from Arm, Right Updated: 01/03/23 0529     WBC 11 66 Thousand/uL      RBC 3 70 Million/uL      Hemoglobin 10 0 g/dL      Hematocrit 30 5 %      MCV 82 fL      MCH 27 0 pg      MCHC 32 8 g/dL      RDW 13 3 %      MPV 9 6 fL      Platelets 041 Thousands/uL      nRBC 0 /100 WBCs      Neutrophils Relative 69 %      Immat GRANS % 2 %      Lymphocytes Relative 23 %      Monocytes Relative 6 %      Eosinophils Relative 0 %      Basophils Relative 0 %      Neutrophils Absolute 7 98 Thousands/µL Immature Grans Absolute 0 21 Thousand/uL      Lymphocytes Absolute 2 67 Thousands/µL      Monocytes Absolute 0 74 Thousand/µL      Eosinophils Absolute 0 04 Thousand/µL      Basophils Absolute 0 02 Thousands/µL                  No orders to display         Procedures  Procedures      ED Course  ED Course as of 01/03/23 0831   Tue Jan 03, 2023   0641 Ab US done at bedside  FHR: 954   8106 Senior OB resident texted for eval ([place] Boo)  States to send to L&D for monitoring after clearance in ED  Pt medically cleared for L&D                             SBIRT 22yo+    Flowsheet Row Most Recent Value   SBIRT (23 yo +)    In order to provide better care to our patients, we are screening all of our patients for alcohol and drug use  Would it be okay to ask you these screening questions? Yes Filed at: 01/03/2023 0609   Initial Alcohol Screen: US AUDIT-C     1  How often do you have a drink containing alcohol? 0 Filed at: 01/03/2023 0609   2  How many drinks containing alcohol do you have on a typical day you are drinking? 0 Filed at: 01/03/2023 0609   3b  FEMALE Any Age, or MALE 65+: How often do you have 4 or more drinks on one occassion? 0 Filed at: 01/03/2023 9148   Audit-C Score 0 Filed at: 01/03/2023 1621   MACKENZIE: How many times in the past year have you    Used an illegal drug or used a prescription medication for non-medical reasons? Never Filed at: 01/03/2023 0609                Medical Decision Making  Abdominal pain: acute illness or injury  Hematemesis: acute illness or injury  Nausea and vomiting in pregnancy: acute illness or injury  Amount and/or Complexity of Data Reviewed  External Data Reviewed: labs, radiology and notes  Labs: ordered  Radiology: independent interpretation performed  Details: Performed bedside transabdominal ultrasound at ED bedside for fetal heart rate              Disposition  Final diagnoses:   Hematemesis   Nausea and vomiting in pregnancy   Abdominal pain     Time reflects when diagnosis was documented in both MDM as applicable and the Disposition within this note     Time User Action Codes Description Comment    1/3/2023  6:46 AM Carol Akua Add [K92 0] Hematemesis     1/3/2023  6:46 AM Regina Jimenez Add [O21 9] Nausea and vomiting in pregnancy     1/3/2023  6:46 AM Regina Jimenez Add [R10 9] Abdominal pain       ED Disposition     ED Disposition   Send to L&D After Provider Eval    Condition   --    Date/Time   Tue Kyle 3, 2023  6:46 AM    Comment   --         Follow-up Information    None         Current Discharge Medication List      CONTINUE these medications which have NOT CHANGED    Details   Prenatal Multivit-Min-Fe-FA (PRE- FORMULA PO) Take by mouth           No discharge procedures on file  PDMP Review       Value Time User    PDMP Reviewed  Yes 10/7/2020  9:08 AM Mary Eli MD           ED Provider  Attending physically available and evaluated Crystal Levine I managed the patient along with the ED Attending      Electronically Signed by         Babar Gutierrez MD  [phone]

## 2023-01-03 NOTE — PROGRESS NOTES
L&D Triage Note - OB/GYN  Danilo Rubio 24 y o  female MRN: [id number]  Unit/Bed#:  TRIAGE 3 Encounter: [id number]      ASSESSMENT:    Danilo Rubio is a 24 y o   at 33w8d who was sent to L&D triage from the ED for evaluation of one episode of vomiting with streaks of blood and abdominal pain at 0400 today  One episode of epigastric pain, since resolved, denies nausea  PLAN:    1) Abdominal Pain/Vomiting  - PO challenge tolerated  - Zofran PRN for nausea  -Labs ordered in ED reviewed; CMP WNL, Lipase 32, Hgb 10 0 (previously 10 5 on 22), Platelet 020 (previously 268 on 22), PTT/INR WNL, UA negative  2)R/o PTL  -SVE unchanged from previous  -CL 2 83 cm  -NST reactive  3) Continue routine prenatal care  4) Discharge from [facility] triage with  labor precautions   - Reviewed rupture of membranes, false vs true labor, decreased fetal movement, and vaginal bleeding  - Pt to call provider with any concerns and follow up at her next scheduled prenatal appointment  - Case discussed with Dr Ger Roca:    Danilo Rubio 24 y o   at 29w6d with an Estimated Date of Delivery: 3/15/23 who was sent to L&D triage from the ED for evaluation of one episode of vomiting with streaks of blood and abdominal pain at 0400 today  One episode of epigastric pain during episode of vomiting, since resolved, denies nausea  Patient reports she was at work lifting heavy boxes when she began to feel nauseous with epigastric pain and had one episode of nonbilious vomiting with streaks of blood  Patient denies any repeat episodes of vomiting  No history of PUD or GI bleed, taking any blood thinners, hx of DVT or PE, NSAID use during pregnancy, coffee-ground emesis or previous episodes of hematemesis  No contractions, LOF, vaginal bleeding  Fetal movement present      Her current obstetrical history is significant for elevated BMI, nausea and vomiting during pregnancy, otherwise negative  Contractions: denies  Leakage of fluid: denies  Vaginal Bleeding: denies  Fetal movement: present    OBJECTIVE:    Vitals:    01/03/23 0740   BP: 96/56   Pulse: 78   Resp: 16   Temp:    SpO2:        ROS:  Constitutional: Negative  Respiratory: +cough, otherwise Negative  Cardiovascular: Negative    Gastrointestinal: +nausea/vomiting (resolved), +epigastric pain (resolved), otherwise Negative  All other systems reviewed and negative    General Physical Exam:  General: in no apparent distress, alert and oriented times 3, afebrile and well hydrated  Cardiovascular: Warm and well-perfused, radial/PT/DP pulses 2+ and symmetric bilaterally  Lungs: non-labored breathing, no respiratory distress  Abdomen: mild tenderness in the epigastrium  , no rebound tenderness, no guarding or rigidity  Lower extremeties: nontender, no unilateral swelling, negative Homans' sign bilaterally    Cervical Exam  Speculum: Cervical os is closed    SVE: 0 / 0% / -4     FHT:  Baseline Rate: 135 bpm  Variability: Moderate 6-25 bpm  Accelerations: 10 x 10 (<32 weeks)  Decelerations: None    TOCO:   Contraction Frequency (minutes): 0    Lab Results   Component Value Date    WBC 11 66 (H) 01/03/2023    HGB 10 0 (L) 01/03/2023    HCT 30 5 (L) 01/03/2023     01/03/2023     Lab Results   Component Value Date    K 3 5 01/03/2023     01/03/2023    CO2 22 01/03/2023    BUN 4 (L) 01/03/2023    CREATININE 0 28 (L) 01/03/2023    AST 18 01/03/2023    ALT 10 01/03/2023       KOH/WTMT:     Infection:   - no clue cells    - no hyphae   - no trichomonads present    Membrane status   - no ferning   - no nitrazene   - no pooling     Urine Dip    - negative, culture sent    Imaging:         TVUS   - Cervical length    - 2 83cm    - 2 97cm    - 2 90cm    Ivy Whitehead MD  OBGYN PGY-1  1/3/2023 11:04 AM

## 2023-01-03 NOTE — PROGRESS NOTES
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

## 2023-01-04 LAB — BACTERIA UR CULT: ABNORMAL

## 2023-01-17 ENCOUNTER — ROUTINE PRENATAL (OUTPATIENT)
Dept: OBGYN CLINIC | Facility: CLINIC | Age: 28
End: 2023-01-17

## 2023-01-17 VITALS — DIASTOLIC BLOOD PRESSURE: 68 MMHG | SYSTOLIC BLOOD PRESSURE: 116 MMHG | WEIGHT: 197 LBS | BODY MASS INDEX: 32.78 KG/M2

## 2023-01-17 DIAGNOSIS — Z23 NEED FOR TDAP VACCINATION: ICD-10-CM

## 2023-01-17 DIAGNOSIS — O21.9 NAUSEA AND VOMITING DURING PREGNANCY: Primary | ICD-10-CM

## 2023-01-17 PROBLEM — Z3A.31 31 WEEKS GESTATION OF PREGNANCY: Status: ACTIVE | Noted: 2022-11-02

## 2023-01-17 NOTE — PROGRESS NOTES
Patient states her feet have started to swell but otherwise she is doing well, she has no concerns  TDAP offered and accepted  Urine dip positive trace protein/negative glucose

## 2023-01-17 NOTE — PROGRESS NOTES
24 y o    female at 27 5 wga for PNV  BP : 116/68  TWG: -13  Feeling well    No complaints  tdap today  Reviewed PTL precautions and FKCs  F/u 2 weeks

## 2023-01-19 ENCOUNTER — TELEPHONE (OUTPATIENT)
Dept: OBGYN CLINIC | Facility: CLINIC | Age: 28
End: 2023-01-19

## 2023-01-19 NOTE — TELEPHONE ENCOUNTER
Patient called, she is 32w1d  She reports continued back pain  She works in a wearhouse  She has tried tylenol and heat  Advised Women's Health PT  Referral in the computer   Phone number provided to patient to call and schedule an appointment

## 2023-01-19 NOTE — TELEPHONE ENCOUNTER
Pt  Requesting work note to be put on light duty d/t hip pain  Pt  States work will accommodate her by allowing her to be seated while she completes her work duties  Pt  Requests letter to be faxed to [phone], Rani Aburto

## 2023-01-19 NOTE — LETTER
January 19, 2023      Patient:            Dana Boudreaux  YOB: 1995        To Whom it May Concern:     Dana Boudreaux is under my professional care   Please allow Sarbjit Estevez to work while seated for entire shift as needed      Please contact us with any questions   Jessica Yoon MD

## 2023-01-20 PROBLEM — O99.210 OBESITY IN PREGNANCY, ANTEPARTUM: Status: ACTIVE | Noted: 2023-01-20

## 2023-01-20 NOTE — PATIENT INSTRUCTIONS
Thank you for choosing us for your  care today  If you have any questions about your ultrasound or care, please do not hesitate to contact us or your primary obstetrician  Some general instructions for your pregnancy are:    Exercise: Aim for 22 minutes per day (150 minutes per week) of regular exercise  Walking is great! Nutrition: Choose healthy sources of calcium, iron, and protein  Learn about Preeclampsia: preeclampsia is a common, serious high blood pressure complication in pregnancy  A blood pressure of 769WWBX (systolic or top number) or 06TCPU (diastolic or bottom number) is not normal and needs evaluation by your doctor  Aspirin is sometimes prescribed in early pregnancy to prevent preeclampsia in women with risk factors - ask your obstetrician if you should be on this medication  For more resources, visit:  https://mothertobaby org/fact-sheets/low-dose-aspirin/  [employer]  https://www highriskpregnancyinfo org/preeclampsia  If you smoke, try to reduce how many cigarettes you smoke or try to quit completely  Do not vape  Other warning signs to watch out for in pregnancy or postpartum: chest pain, obstructed breathing or shortness of breath, seizures, thoughts of hurting yourself or your baby, bleeding, a painful or swollen leg, fever, or headache (see [employer]WHONN POST-BIRTH Warning Signs campaign)  If these happen call 911  Itching is also not normal in pregnancy and if you experience this, especially over your hands and feet, potentially worse at night, notify your doctors

## 2023-01-22 ENCOUNTER — TELEPHONE (OUTPATIENT)
Dept: OTHER | Facility: OTHER | Age: 28
End: 2023-01-22

## 2023-01-22 NOTE — TELEPHONE ENCOUNTER
Patient stated that the note that was placed in her chart for her work absence needs to be amended  It needs to state, patient is restricted to seated duty for an 8 hour shift

## 2023-01-23 PROBLEM — E66.9 OBESITY (BMI 30-39.9): Status: RESOLVED | Noted: 2022-11-02 | Resolved: 2023-01-23

## 2023-01-23 PROBLEM — R53.83 FATIGUE: Status: RESOLVED | Noted: 2017-05-18 | Resolved: 2023-01-23

## 2023-01-23 PROBLEM — Z71.85 VACCINE COUNSELING: Status: RESOLVED | Noted: 2022-11-02 | Resolved: 2023-01-23

## 2023-01-23 PROBLEM — Z3A.32 32 WEEKS GESTATION OF PREGNANCY: Status: ACTIVE | Noted: 2022-11-02

## 2023-01-23 PROBLEM — Z28.21 REFUSED INFLUENZA VACCINE: Status: RESOLVED | Noted: 2020-02-18 | Resolved: 2023-01-23

## 2023-01-24 ENCOUNTER — ULTRASOUND (OUTPATIENT)
Facility: HOSPITAL | Age: 28
End: 2023-01-24

## 2023-01-24 VITALS
SYSTOLIC BLOOD PRESSURE: 118 MMHG | BODY MASS INDEX: 33.22 KG/M2 | DIASTOLIC BLOOD PRESSURE: 84 MMHG | HEART RATE: 84 BPM | WEIGHT: 199.4 LBS | HEIGHT: 65 IN

## 2023-01-24 DIAGNOSIS — Z36.89 ENCOUNTER FOR ULTRASOUND TO ASSESS FETAL GROWTH: ICD-10-CM

## 2023-01-24 DIAGNOSIS — Z3A.32 32 WEEKS GESTATION OF PREGNANCY: ICD-10-CM

## 2023-01-24 DIAGNOSIS — O99.210 OBESITY IN PREGNANCY, ANTEPARTUM: Primary | ICD-10-CM

## 2023-01-24 PROBLEM — M46.1 SACROILIITIS (HCC): Status: ACTIVE | Noted: 2023-01-24

## 2023-01-24 NOTE — LETTER
January 24, 2023     [address] [place] [address]  [address]  [facility] [address]    Patient: Graham Noonan   YOB: 1995   Date of Visit: 1/24/2023       Dear Dr Jade House:    Thank you for referring Graham Noonan to me for evaluation  Below are my notes for this consultation  If you have questions, please do not hesitate to call me  I look forward to following your patient along with you  Sincerely,        Edilia Brown MD        CC: No Recipients  Edilia Brown MD  1/24/2023 10:31 AM  Sign when Signing Visit  [address]: Graham Noonan was seen today at [address] [place] [address] for fetal growth assessment ultrasound  See ultrasound report under "OB Procedures" tab    Please don't hesitate to contact our office with any concerns or questions   -Edilia Brown MD

## 2023-01-24 NOTE — PROGRESS NOTES
[address]: Kandice Sullivan [facility]  See ultrasound report under "OB Procedures" tab    Please don't hesitate to contact our office with any concerns or questions   -Jelena Barnes MD

## 2023-01-31 ENCOUNTER — ROUTINE PRENATAL (OUTPATIENT)
Dept: OBGYN CLINIC | Facility: CLINIC | Age: 28
End: 2023-01-31

## 2023-01-31 VITALS — DIASTOLIC BLOOD PRESSURE: 72 MMHG | SYSTOLIC BLOOD PRESSURE: 120 MMHG | BODY MASS INDEX: 33.61 KG/M2 | WEIGHT: 202 LBS

## 2023-01-31 DIAGNOSIS — Z3A.34 34 WEEKS GESTATION OF PREGNANCY: Primary | ICD-10-CM

## 2023-01-31 DIAGNOSIS — Z34.03 ENCOUNTER FOR SUPERVISION OF NORMAL FIRST PREGNANCY IN THIRD TRIMESTER: ICD-10-CM

## 2023-01-31 NOTE — PROGRESS NOTES
24 y o   female at 32w10d (Estimated Date of Delivery: 3/15/23) for PNV  Pt is doing well, no concerns today  Pre-Briana Vitals    Flowsheet Row Most Recent Value   Prenatal Assessment    Fetal Heart Rate 150   Movement Present   Prenatal Vitals    Blood Pressure 120/72   Weight - Scale 91 6 kg (202 lb)   Urine Albumin/Glucose    Dilation/Effacement/Station    Vaginal Drainage    Edema         TWG: -3 629 kg (-8 lb) - reviewed with patient  She has gained weight since last visit  Started at 208 lbs, got down to 193 at 21 weeks, now back up to 202lbs  No concerns at this time  Leakage of fluid: no  Vaginal bleeding: no  Contractions/Cramping: no  Fetal movement: yes    Recent growth: 32w: 2103g 4#10 (45%)    RTO in 2 weeks

## 2023-02-15 ENCOUNTER — ROUTINE PRENATAL (OUTPATIENT)
Dept: OBGYN CLINIC | Facility: CLINIC | Age: 28
End: 2023-02-15

## 2023-02-15 VITALS — SYSTOLIC BLOOD PRESSURE: 118 MMHG | DIASTOLIC BLOOD PRESSURE: 70 MMHG | WEIGHT: 208.8 LBS | BODY MASS INDEX: 34.75 KG/M2

## 2023-02-15 DIAGNOSIS — O99.210 OBESITY IN PREGNANCY, ANTEPARTUM: ICD-10-CM

## 2023-02-15 DIAGNOSIS — Z3A.36 36 WEEKS GESTATION OF PREGNANCY: Primary | ICD-10-CM

## 2023-02-15 NOTE — PROGRESS NOTES
24 y o   female at 44w0d (Estimated Date of Delivery: 3/15/23) for PNV  Pre-Briana Vitals    Flowsheet Row Most Recent Value   Prenatal Assessment    Fetal Heart Rate 140   Movement Present   Prenatal Vitals    Blood Pressure 118/70   Weight - Scale 94 7 kg (208 lb 12 8 oz)   Urine Albumin/Glucose    Dilation/Effacement/Station    Vaginal Drainage    Edema         TWG: -0 544 kg (-1 lb 3 2 oz)    Leakage of fluid: no  Vaginal bleeding: no  Contractions/Cramping: no  Fetal movement: yes    GBS done: Yes  PCN allergy: Yes  Chlamydia/gonorrhea swab done: Yes  Delivery plan: discussed option for 39 week IOL  Patient unsure at this time  Continue to discuss  Labor precautions given  [address] reviewed  RTO in 1 weeks

## 2023-02-15 NOTE — PROGRESS NOTES
Routine prenatal, 36 weeks, GBS and G/C today  Pt is well, questions regarding short-term disability paper work  Denies vaginal bleeding and leakage of fluid   Urine dip test neg protein/neg glucose

## 2023-02-16 LAB
C TRACH DNA SPEC QL NAA+PROBE: NEGATIVE
N GONORRHOEA DNA SPEC QL NAA+PROBE: NEGATIVE

## 2023-02-17 LAB — GP B STREP DNA SPEC QL NAA+PROBE: NEGATIVE

## 2023-02-22 ENCOUNTER — ROUTINE PRENATAL (OUTPATIENT)
Dept: OBGYN CLINIC | Facility: CLINIC | Age: 28
End: 2023-02-22

## 2023-02-22 VITALS — WEIGHT: 208 LBS | BODY MASS INDEX: 34.61 KG/M2 | SYSTOLIC BLOOD PRESSURE: 116 MMHG | DIASTOLIC BLOOD PRESSURE: 76 MMHG

## 2023-02-22 DIAGNOSIS — Z34.03 ENCOUNTER FOR SUPERVISION OF NORMAL FIRST PREGNANCY IN THIRD TRIMESTER: Primary | ICD-10-CM

## 2023-02-22 DIAGNOSIS — Z3A.37 37 WEEKS GESTATION OF PREGNANCY: ICD-10-CM

## 2023-02-22 NOTE — PROGRESS NOTES
Undressed for a cervical check  Patient states she does have some irregular BH contractions but denies any fluid leaking or pelvic pressure

## 2023-02-22 NOTE — PROGRESS NOTES
Patient is a 23 YO  female presenting to the office at 37 0 weeks for routine OB care     BP: 116/76  TWG: -2lb  Fetal Movement: yes good movement  Feeling well today  Denies LOF, VB  Having some inconsistent BH CTX  Considering 39 week IOL  Reviewed labor precautions  RTO 1 week

## 2023-03-02 ENCOUNTER — ROUTINE PRENATAL (OUTPATIENT)
Dept: OBGYN CLINIC | Facility: CLINIC | Age: 28
End: 2023-03-02

## 2023-03-02 VITALS — WEIGHT: 211 LBS | DIASTOLIC BLOOD PRESSURE: 80 MMHG | BODY MASS INDEX: 35.11 KG/M2 | SYSTOLIC BLOOD PRESSURE: 120 MMHG

## 2023-03-02 DIAGNOSIS — Z34.03 ENCOUNTER FOR SUPERVISION OF NORMAL FIRST PREGNANCY IN THIRD TRIMESTER: Primary | ICD-10-CM

## 2023-03-02 DIAGNOSIS — Z3A.38 38 WEEKS GESTATION OF PREGNANCY: ICD-10-CM

## 2023-03-02 NOTE — PROGRESS NOTES
Patient is a 25 YO  female presenting to the office at 38 1 weeks for routine OB care     BP: 120/80  TWlb  Fetal Movement: yes good movement  Feeling well today  Denies LOF, CTX, VB  Would like to schedule IOL  Reviewed labor precautions  Call for concerns  RTO 1 week

## 2023-03-02 NOTE — PROGRESS NOTES
Pt is here for her 38w prenatal  Pt has swelling in feet and face and has a lot of pressure  Pt denies leakage,bleeding,and contractions   She would like a cervical exam today

## 2023-03-07 ENCOUNTER — PATIENT MESSAGE (OUTPATIENT)
Dept: OBGYN CLINIC | Facility: CLINIC | Age: 28
End: 2023-03-07

## 2023-03-08 ENCOUNTER — ROUTINE PRENATAL (OUTPATIENT)
Dept: OBGYN CLINIC | Facility: CLINIC | Age: 28
End: 2023-03-08

## 2023-03-08 VITALS — WEIGHT: 211.8 LBS | SYSTOLIC BLOOD PRESSURE: 116 MMHG | DIASTOLIC BLOOD PRESSURE: 60 MMHG | BODY MASS INDEX: 35.25 KG/M2

## 2023-03-08 DIAGNOSIS — O99.210 OBESITY IN PREGNANCY, ANTEPARTUM: ICD-10-CM

## 2023-03-08 DIAGNOSIS — Z3A.39 39 WEEKS GESTATION OF PREGNANCY: Primary | ICD-10-CM

## 2023-03-08 NOTE — PROGRESS NOTES
24 y o   female at 39w0d (Estimated Date of Delivery: 3/15/23) for PNV  Pre-Briana Vitals    Flowsheet Row Most Recent Value   Prenatal Assessment    Fetal Heart Rate 145   Movement Present   Prenatal Vitals    Blood Pressure 116/60   Weight - Scale 96 1 kg (211 lb 12 8 oz)   Urine Albumin/Glucose    Dilation/Effacement/Station    Cervical Dilation 1   Cervical Effacement 0   Fetal Station -2   Vaginal Drainage    Edema          kg (1 lb 12 8 oz)    Leakage of fluid: no  Vaginal bleeding: no  Contractions/Cramping: no  Fetal movement: yes  SVE: 1/thick/-2, soft, posterior  Induction of labor scheduled for 3/13/2023 at 8 PM   Reviewed risks and benefits of induction of labor  Discussed cervical ripening methods including misoprostol, Dorado balloon, and Pitocin titration for induction  Patient expressed understanding of all that was discussed  Consent was signed today  RTO in 6 weeks postpartum visit

## 2023-03-08 NOTE — PROGRESS NOTES
Routine prenatal, 39 weeks  Pt is well, no concerns  Denies vaginal bleeding and leakage of fluid   Swelling is present in the lower extremities

## 2023-03-13 ENCOUNTER — HOSPITAL ENCOUNTER (INPATIENT)
Facility: HOSPITAL | Age: 28
LOS: 3 days | Discharge: HOME | End: 2023-03-16
Attending: OBSTETRICS & GYNECOLOGY | Admitting: OBSTETRICS & GYNECOLOGY

## 2023-03-13 ENCOUNTER — HOSPITAL ENCOUNTER (OUTPATIENT)
Dept: LABOR AND DELIVERY | Facility: HOSPITAL | Age: 28
Discharge: HOME | End: 2023-03-13

## 2023-03-13 DIAGNOSIS — Z3A.39 39 WEEKS GESTATION OF PREGNANCY: Primary | ICD-10-CM

## 2023-03-13 PROBLEM — O09.899 MATERNAL VARICELLA, NON-IMMUNE: Status: ACTIVE | Noted: 2023-03-13

## 2023-03-13 PROBLEM — Z28.39 MATERNAL VARICELLA, NON-IMMUNE: Status: ACTIVE | Noted: 2023-03-13

## 2023-03-13 LAB
ERYTHROCYTE [DISTWIDTH] IN BLOOD BY AUTOMATED COUNT: 14.8 % (ref 11.6–15.1)
HCT VFR BLD AUTO: 32 % (ref 34.8–46.1)
HGB BLD-MCNC: 10.2 G/DL (ref 11.5–15.4)
MCH RBC QN AUTO: 24.3 PG (ref 26.8–34.3)
MCHC RBC AUTO-ENTMCNC: 31.9 G/DL (ref 31.4–37.4)
MCV RBC AUTO: 76 FL (ref 82–98)
PLATELET # BLD AUTO: 322 THOUSANDS/UL (ref 149–390)
PMV BLD AUTO: 10.5 FL (ref 8.9–12.7)
RBC # BLD AUTO: 4.19 MILLION/UL (ref 3.81–5.12)
WBC # BLD AUTO: 11.95 THOUSAND/UL (ref 4.31–10.16)

## 2023-03-13 PROCEDURE — 4A1HXCZ MONITORING OF PRODUCTS OF CONCEPTION, CARDIAC RATE, EXTERNAL APPROACH: ICD-10-PCS | Performed by: OBSTETRICS & GYNECOLOGY

## 2023-03-13 PROCEDURE — 3E033VJ INTRODUCTION OF OTHER HORMONE INTO PERIPHERAL VEIN, PERCUTANEOUS APPROACH: ICD-10-PCS | Performed by: OBSTETRICS & GYNECOLOGY

## 2023-03-13 RX ORDER — CALCIUM CARBONATE 200(500)MG
500 TABLET,CHEWABLE ORAL 3 TIMES DAILY PRN
Status: DISCONTINUED | OUTPATIENT
Start: 2023-03-13 | End: 2023-03-14

## 2023-03-13 RX ORDER — BUPIVACAINE HYDROCHLORIDE 2.5 MG/ML
30 INJECTION, SOLUTION EPIDURAL; INFILTRATION; INTRACAUDAL ONCE AS NEEDED
Status: DISCONTINUED | OUTPATIENT
Start: 2023-03-13 | End: 2023-03-16 | Stop reason: HOSPADM

## 2023-03-13 RX ORDER — ONDANSETRON 2 MG/ML
4 INJECTION INTRAMUSCULAR; INTRAVENOUS EVERY 4 HOURS PRN
Status: DISCONTINUED | OUTPATIENT
Start: 2023-03-13 | End: 2023-03-14

## 2023-03-13 RX ORDER — SODIUM CHLORIDE, SODIUM LACTATE, POTASSIUM CHLORIDE, CALCIUM CHLORIDE 600; 310; 30; 20 MG/100ML; MG/100ML; MG/100ML; MG/100ML
125 INJECTION, SOLUTION INTRAVENOUS CONTINUOUS
Status: DISCONTINUED | OUTPATIENT
Start: 2023-03-13 | End: 2023-03-16 | Stop reason: HOSPADM

## 2023-03-14 ENCOUNTER — ANESTHESIA EVENT (INPATIENT)
Dept: ANESTHESIOLOGY | Facility: HOSPITAL | Age: 28
End: 2023-03-14

## 2023-03-14 ENCOUNTER — ANESTHESIA (INPATIENT)
Dept: ANESTHESIOLOGY | Facility: HOSPITAL | Age: 28
End: 2023-03-14

## 2023-03-14 LAB
ABO GROUP BLD: NORMAL
BASE EXCESS BLDCOA CALC-SCNC: -4.2 MMOL/L (ref 3–11)
BASE EXCESS BLDCOV CALC-SCNC: -3.4 MMOL/L (ref 1–9)
BLD GP AB SCN SERPL QL: NEGATIVE
HCO3 BLDCOA-SCNC: 20.9 MMOL/L (ref 17.3–27.3)
HCO3 BLDCOV-SCNC: 21.6 MMOL/L (ref 12.2–28.6)
HOLD SPECIMEN: NORMAL
O2 CT VFR BLDCOA CALC: 11.8 ML/DL
OXYHGB MFR BLDCOA: 55 %
OXYHGB MFR BLDCOV: 55.4 %
PCO2 BLDCOA: 38.6 MM[HG] (ref 30–60)
PCO2 BLDCOV: 39 MM HG (ref 27–43)
PH BLDCOA: 7.35 [PH] (ref 7.23–7.43)
PH BLDCOV: 7.36 [PH] (ref 7.19–7.49)
PO2 BLDCOA: 23.6 MM HG (ref 5–25)
PO2 BLDCOV: 22.9 MM HG (ref 15–45)
RH BLD: POSITIVE
SAO2 % BLDCOV: 11.9 ML/DL
SPECIMEN EXPIRATION DATE: NORMAL
TREPONEMA PALLIDUM IGG+IGM AB [PRESENCE] IN SERUM OR PLASMA BY IMMUNOASSAY: NORMAL

## 2023-03-14 PROCEDURE — 10907ZC DRAINAGE OF AMNIOTIC FLUID, THERAPEUTIC FROM PRODUCTS OF CONCEPTION, VIA NATURAL OR ARTIFICIAL OPENING: ICD-10-PCS | Performed by: OBSTETRICS & GYNECOLOGY

## 2023-03-14 PROCEDURE — 0HQ9XZZ REPAIR PERINEUM SKIN, EXTERNAL APPROACH: ICD-10-PCS | Performed by: OBSTETRICS & GYNECOLOGY

## 2023-03-14 RX ORDER — IBUPROFEN 600 MG/1
600 TABLET ORAL EVERY 6 HOURS
Status: DISCONTINUED | OUTPATIENT
Start: 2023-03-14 | End: 2023-03-16 | Stop reason: HOSPADM

## 2023-03-14 RX ORDER — CLONIDINE 100 UG/ML
INJECTION, SOLUTION EPIDURAL AS NEEDED
Status: DISCONTINUED | OUTPATIENT
Start: 2023-03-14 | End: 2023-03-17 | Stop reason: HOSPADM

## 2023-03-14 RX ORDER — LIDOCAINE HYDROCHLORIDE AND EPINEPHRINE 15; 5 MG/ML; UG/ML
INJECTION, SOLUTION EPIDURAL AS NEEDED
Status: DISCONTINUED | OUTPATIENT
Start: 2023-03-14 | End: 2023-03-17 | Stop reason: HOSPADM

## 2023-03-14 RX ORDER — OXYTOCIN/RINGER'S LACTATE 30/500 ML
1-30 PLASTIC BAG, INJECTION (ML) INTRAVENOUS
Status: DISCONTINUED | OUTPATIENT
Start: 2023-03-14 | End: 2023-03-14

## 2023-03-14 RX ORDER — ACETAMINOPHEN 325 MG/1
650 TABLET ORAL EVERY 4 HOURS PRN
Status: DISCONTINUED | OUTPATIENT
Start: 2023-03-14 | End: 2023-03-16 | Stop reason: HOSPADM

## 2023-03-14 RX ORDER — DOCUSATE SODIUM 100 MG/1
100 CAPSULE, LIQUID FILLED ORAL 2 TIMES DAILY
Status: DISCONTINUED | OUTPATIENT
Start: 2023-03-14 | End: 2023-03-16 | Stop reason: HOSPADM

## 2023-03-14 RX ORDER — DIAPER,BRIEF,INFANT-TODD,DISP
1 EACH MISCELLANEOUS DAILY PRN
Status: DISCONTINUED | OUTPATIENT
Start: 2023-03-14 | End: 2023-03-16 | Stop reason: HOSPADM

## 2023-03-14 RX ORDER — ONDANSETRON 2 MG/ML
4 INJECTION INTRAMUSCULAR; INTRAVENOUS EVERY 8 HOURS PRN
Status: DISCONTINUED | OUTPATIENT
Start: 2023-03-14 | End: 2023-03-16 | Stop reason: HOSPADM

## 2023-03-14 RX ORDER — CALCIUM CARBONATE 200(500)MG
1000 TABLET,CHEWABLE ORAL 3 TIMES DAILY PRN
Status: DISCONTINUED | OUTPATIENT
Start: 2023-03-14 | End: 2023-03-16 | Stop reason: HOSPADM

## 2023-03-14 RX ORDER — OXYTOCIN/RINGER'S LACTATE 30/500 ML
250 PLASTIC BAG, INJECTION (ML) INTRAVENOUS ONCE
Status: DISCONTINUED | OUTPATIENT
Start: 2023-03-14 | End: 2023-03-16 | Stop reason: HOSPADM

## 2023-03-14 RX ADMIN — ROPIVACAINE HYDROCHLORIDE: 2 INJECTION, SOLUTION EPIDURAL; INFILTRATION at 15:50

## 2023-03-14 RX ADMIN — SODIUM CHLORIDE, SODIUM LACTATE, POTASSIUM CHLORIDE, AND CALCIUM CHLORIDE 125 ML/HR: .6; .31; .03; .02 INJECTION, SOLUTION INTRAVENOUS at 00:03

## 2023-03-14 RX ADMIN — ROPIVACAINE HYDROCHLORIDE: 2 INJECTION, SOLUTION EPIDURAL; INFILTRATION at 10:30

## 2023-03-14 RX ADMIN — SODIUM CHLORIDE, SODIUM LACTATE, POTASSIUM CHLORIDE, AND CALCIUM CHLORIDE 125 ML/HR: .6; .31; .03; .02 INJECTION, SOLUTION INTRAVENOUS at 13:27

## 2023-03-14 RX ADMIN — ROPIVACAINE HYDROCHLORIDE: 2 INJECTION, SOLUTION EPIDURAL; INFILTRATION at 19:41

## 2023-03-14 RX ADMIN — ONDANSETRON 4 MG: 2 INJECTION INTRAMUSCULAR; INTRAVENOUS at 05:08

## 2023-03-14 RX ADMIN — Medication 2 MILLI-UNITS/MIN: at 06:38

## 2023-03-14 RX ADMIN — Medication 25 MCG: at 02:38

## 2023-03-14 RX ADMIN — LIDOCAINE HYDROCHLORIDE AND EPINEPHRINE 5 ML: 15; 5 INJECTION, SOLUTION EPIDURAL at 10:31

## 2023-03-14 RX ADMIN — SODIUM CHLORIDE, SODIUM LACTATE, POTASSIUM CHLORIDE, AND CALCIUM CHLORIDE 125 ML/HR: .6; .31; .03; .02 INJECTION, SOLUTION INTRAVENOUS at 18:21

## 2023-03-14 RX ADMIN — CLONIDINE 100 MCG: 100 INJECTION, SOLUTION EPIDURAL at 12:10

## 2023-03-14 RX ADMIN — SODIUM CHLORIDE, SODIUM LACTATE, POTASSIUM CHLORIDE, AND CALCIUM CHLORIDE 125 ML/HR: .6; .31; .03; .02 INJECTION, SOLUTION INTRAVENOUS at 08:01

## 2023-03-14 NOTE — OB LABOR/OXYTOCIN SAFETY PROGRESS
Oxytocin Safety Progress Check Note - Laura Massed 24 y o  female MRN: [id number]    Unit/Bed#: -01 Encounter: [id number]    Dose (alhaji-units/min) Oxytocin: 12 alhaji-units/min  Contraction Frequency (minutes): 1-5  Contraction Quality: Mild  Tachysystole: No   Cervical Dilation: 4        Cervical Effacement: 90  Fetal Station: -1  Baseline Rate: 130 bpm  Fetal Heart Rate: 136 BPM  FHR Category: Category I               Vital Signs:   Vitals:    03/14/23 1401   BP: 108/68   Pulse: 73   Resp:    Temp:    SpO2:        Notes/comments:   SVE as above  Patient is currently comfortable with epidural Continue pitocin titration    Discussed with Dr Mary Castle MD 3/14/2023 2:22 PM

## 2023-03-14 NOTE — OB LABOR/OXYTOCIN SAFETY PROGRESS
Oxytocin Safety Progress Check Note - Dave Parker 24 y o  female MRN: [id number]    Unit/Bed#: -01 Encounter: [id number]    Dose (alhaji-units/min) Oxytocin: 16 alhaji-units/min  Contraction Frequency (minutes): 1-3  Contraction Quality: Moderate  Tachysystole: No   Cervical Dilation: 4        Cervical Effacement: 90  Fetal Station: -1  Baseline Rate: 125 bpm  Fetal Heart Rate: 122 BPM  FHR Category: Category I               Vital Signs:   Vitals:    03/14/23 1632   BP: 96/61   Pulse: 70   Resp:    Temp:    SpO2:        Notes/comments:   Check deferred at this time  Patient is currently comfortable with epidural     Continue pitocin titration    Discussed with Dr Remi Barrow MD 3/14/2023 4:48 PM

## 2023-03-14 NOTE — PLAN OF CARE
Problem: BIRTH - VAGINAL/ SECTION  Goal: Fetal and maternal status remain reassuring during the birth process  Description: INTERVENTIONS:  - Monitor vital signs  - Monitor fetal heart rate  - Monitor uterine activity  - Monitor labor progression (vaginal delivery)  - DVT prophylaxis  - Antibiotic prophylaxis  Outcome: Progressing  Goal: Emotionally satisfying birthing experience for mother/fetus  Description: Interventions:  - Assess, plan, implement and evaluate the nursing care given to the patient in labor  - Advocate the philosophy that each childbirth experience is a unique experience and support the family's chosen level of involvement and control during the labor process   - Actively participate in both the patient's and family's teaching of the birth process  - Consider cultural, Uatsdin and age-specific factors and plan care for the patient in labor  Outcome: Progressing     Problem: Knowledge Deficit  Goal: Patient/family/caregiver demonstrates understanding of disease process, treatment plan, medications, and discharge instructions  Description: Complete learning assessment and assess knowledge base  Interventions:  - Provide teaching at level of understanding  - Provide teaching via preferred learning methods  Outcome: Progressing  Goal: Verbalizes understanding of labor plan  Description: Assess patient/family/caregiver's baseline knowledge level and ability to understand information  Provide education via patient/family/caregiver's preferred learning method at appropriate level of understanding  1  Provide teaching at level of understanding  2  Provide teaching via preferred learning method(s)    Outcome: Progressing     Problem: DISCHARGE PLANNING  Goal: Discharge to home or other facility with appropriate resources  Description: INTERVENTIONS:  - Identify barriers to discharge w/patient and caregiver  - Arrange for needed discharge resources and transportation as appropriate  - Identify discharge learning needs (meds, wound care, etc )  - Arrange for interpretive services to assist at discharge as needed  - Refer to Case Management Department for coordinating discharge planning if the patient needs post-hospital services based on physician/advanced practitioner order or complex needs related to functional status, cognitive ability, or social support system  Outcome: Progressing     Problem: Labor & Delivery  Goal: Manages discomfort  Description: Assess and monitor for signs and symptoms of discomfort  Assess patient's pain level regularly and per hospital policy  Administer medications as ordered  Support use of nonpharmacological methods to help control pain such as distraction, imagery, relaxation, and application of heat and cold  Collaborate with interdisciplinary team and patient to determine appropriate pain management plan  1  Include patient in decisions related to comfort  2  Offer non-pharmacological pain management interventions  3  Report ineffective pain management to physician  Outcome: Progressing  Goal: Patient vital signs are stable  Description: 1  Assess vital signs - vaginal delivery    Outcome: Progressing

## 2023-03-14 NOTE — OB LABOR/OXYTOCIN SAFETY PROGRESS
Oxytocin Safety Progress Check Note - Shai Velasquezg 24 y o  female MRN: [id number]    Unit/Bed#: -01 Encounter: [id number]    Dose (alhaji-units/min) Oxytocin: 4 alhaji-units/min  Contraction Frequency (minutes): 1 5-2  Contraction Quality: Mild  Tachysystole: No   Cervical Dilation: 4        Cervical Effacement: 80  Fetal Station: -2  Baseline Rate: 125 bpm  Fetal Heart Rate: 140 BPM  FHR Category: Category I               Vital Signs:   Vitals:    03/14/23 0806   BP: 124/76   Pulse: 67   Resp:    Temp:    SpO2:        Notes/comments:   Noticing contractions but not too painful  Cat 1 tracing  Plan for AROM at next check  Continue pitocin titration        Allyssa Valencia DO 3/14/2023 8:18 AM

## 2023-03-14 NOTE — OB LABOR/OXYTOCIN SAFETY PROGRESS
Labor Progress Note - Ashley Sotelo 24 y o  female MRN: [id number]    Unit/Bed#: -01 Encounter: [id number]       Contraction Frequency (minutes): irregular  Contraction Quality: Mild      Cervical Dilation: 1        Cervical Effacement: 60  Fetal Station: -2  Baseline Rate: 130 bpm  Fetal Heart Rate: 151 BPM  FHR Category: Category I               Vital Signs:   Vitals:    03/13/23 2229   BP: 120/74   Pulse: 85   Resp: 18   Temp: 98 6 °F (37 °C)   SpO2: 99%       Notes/comments: Lei Balloon Induction Procedure    Reason for induction: elective  Induction plan previously discussed with Dr Margaux Chowdhury  Procedural risks reviewed, consent obtained  The patient was placed in dorsal lithotomy position  Using Sterile technique, a 24F lei balloon was advanced beyond the internal cervix os and inflated with 60cc LR  The catheter was clamped with an umbilical cord clamp and instructions were given to the patient's nurse to weight the balloon with a 1-L bag of LR  Cytotec 25mcg was then placed in the posterior fornix of the vagina  The procedure was uncomplicated and the patient tolerated the procedure well  The procedure was completed at 0235          Vannessa Valenzuela MD 3/14/2023 2:38 AM

## 2023-03-14 NOTE — ANESTHESIA PREPROCEDURE EVALUATION
Procedure:  LABOR ANALGESIA    Relevant Problems   GYN   (+) 39 weeks gestation of pregnancy      MUSCULOSKELETAL   (+) Sacroiliitis (HCC)      NEURO/PSYCH   (+) Anxiety and depression   (+) Diplopia        Physical Exam    Airway    Mallampati score: II  TM Distance: >3 FB  Neck ROM: full     Dental   No notable dental hx     Cardiovascular      Pulmonary      Other Findings        Anesthesia Plan  ASA Score- 2     Anesthesia Type- epidural with ASA Monitors  Additional Monitors:   Airway Plan:           Plan Factors-Exercise tolerance (METS): >4 METS  Chart reviewed  Existing labs reviewed  Patient summary reviewed  Patient is not a current smoker  Induction-     Postoperative Plan-     Informed Consent- Anesthetic plan and risks discussed with patient  I personally reviewed this patient with the CRNA  Discussed and agreed on the Anesthesia Plan with the CRNA  Sonya Andrade

## 2023-03-14 NOTE — PLAN OF CARE
Problem: BIRTH - VAGINAL/ SECTION  Goal: Fetal and maternal status remain reassuring during the birth process  Description: INTERVENTIONS:  - Monitor vital signs  - Monitor fetal heart rate  - Monitor uterine activity  - Monitor labor progression (vaginal delivery)  - DVT prophylaxis  - Antibiotic prophylaxis  Outcome: Progressing  Goal: Emotionally satisfying birthing experience for mother/fetus  Description: Interventions:  - Assess, plan, implement and evaluate the nursing care given to the patient in labor  - Advocate the philosophy that each childbirth experience is a unique experience and support the family's chosen level of involvement and control during the labor process   - Actively participate in both the patient's and family's teaching of the birth process  - Consider cultural, Muslim and age-specific factors and plan care for the patient in labor  Outcome: Progressing     Problem: Knowledge Deficit  Goal: Patient/family/caregiver demonstrates understanding of disease process, treatment plan, medications, and discharge instructions  Description: Complete learning assessment and assess knowledge base    Interventions:  - Provide teaching at level of understanding  - Provide teaching via preferred learning methods  Outcome: Progressing     Problem: DISCHARGE PLANNING  Goal: Discharge to home or other facility with appropriate resources  Description: INTERVENTIONS:  - Identify barriers to discharge w/patient and caregiver  - Arrange for needed discharge resources and transportation as appropriate  - Identify discharge learning needs (meds, wound care, etc )  - Arrange for interpretive services to assist at discharge as needed  - Refer to Case Management Department for coordinating discharge planning if the patient needs post-hospital services based on physician/advanced practitioner order or complex needs related to functional status, cognitive ability, or social support system  Outcome: Progressing

## 2023-03-14 NOTE — OB LABOR/OXYTOCIN SAFETY PROGRESS
Oxytocin Safety Progress Check Note - Garrison Beech 24 y o  female MRN: [id number]    Unit/Bed#: -01 Encounter: [id number]    Dose (alhaji-units/min) Oxytocin: 18 alhaji-units/min  Contraction Frequency (minutes): 1-3 5  Contraction Quality: Moderate  Tachysystole: No   Cervical Dilation: 8        Cervical Effacement: 90  Fetal Station: 0  Baseline Rate: 130 bpm  Fetal Heart Rate: 132 BPM  FHR Category: Category I               Vital Signs:   Vitals:    03/14/23 1811   BP: 108/67   Pulse: 77   Resp:    Temp:    SpO2:        Notes/comments:   Feeling better after epidural bolus from anesthesia  Making excellent change  Cat 1 tracing  Hopeful to start pushing in the next hour or so        Nicolas Umaña DO 3/14/2023 6:20 PM

## 2023-03-14 NOTE — ANESTHESIA PROCEDURE NOTES
Epidural Block    Patient location during procedure: OB  Start time: 3/14/2023 10:28 AM  Reason for block: procedure for pain and at surgeon's request  Staffing  Performed: CRNA   Anesthesiologist: Ludwig Burnett MD  Resident/CRNA: Valerie Gonzales CRNA  Preanesthetic Checklist  Completed: patient identified, IV checked, site marked, risks and benefits discussed, surgical consent, monitors and equipment checked, pre-op evaluation and timeout performed  Epidural  Patient position: sitting  Prep: ChloraPrep  Patient monitoring: cardiac monitor and frequent blood pressure checks  Approach: midline  Location: lumbar  Injection technique: JOSELIN saline  Needle  Needle type: Tuohy   Needle gauge: 18 G  Catheter type: side hole  Catheter size: 20 G  Catheter at skin depth: 12 cm  Catheter securement method: stabilization device  Test dose: negative  Assessment  Number of attempts: 1negative aspiration for CSF, negative aspiration for heme and no paresthesia on injection  patient tolerated the procedure well with no immediate complications

## 2023-03-14 NOTE — H&P
H&P Exam - Obstetrics   Shai Foote 24 y o  female MRN: [id number]  Unit/Bed#: -01 Encounter: [id number]    Assessment/Plan     * 39 weeks gestation of pregnancy  Assessment & Plan  Admit to OBGYN   Clear liquid diet   F/u T&S, CBC, RPR   IVF LR 125cc/hr   Continuous fetal monitoring and tocometry   Analgesia at maternal request   Vertex by TAUS  Induction plan: lei followed by pitocin       Maternal varicella, non-immune  Assessment & Plan  Needs varicella vaccine postpartum    Family history of congenital heart disease  Assessment & Plan  Normal fetal echo 11/15/22        History of Present Illness   Chief Complaint: Forrestine Gary    HPI:  Shai Foote is a 24 y o   female with an CEASAR of 3/15/2023, by Last Menstrual Period at 39w5d weeks gestation who is being admitted for eIOL  Her current obstetrical history is significant for Varicella non-immune  Contractions: None  Leakage of fluid: None  Bleeding: None  Fetal movement: present  Pregnancy complications: none  Review of Systems   Constitutional: Negative for chills and fever  HENT: Negative for ear pain and sore throat  Eyes: Negative for pain and visual disturbance  Respiratory: Negative for cough and shortness of breath  Cardiovascular: Negative for chest pain and palpitations  Gastrointestinal: Negative for abdominal pain and vomiting  Genitourinary: Negative for dysuria and hematuria  Musculoskeletal: Positive for back pain  Negative for arthralgias  Skin: Negative for color change and rash  Neurological: Negative for seizures and syncope  All other systems reviewed and are negative        Historical Information   OB History    Para Term  AB Living   1             SAB IAB Ectopic Multiple Live Births                  # Outcome Date GA Lbr Thee/2nd Weight Sex Delivery Anes PTL Lv   1 Current              Baby complications/comments:   Past Medical History:   Diagnosis Date   • Anxiety Last assessed 2017    • Bursitis    • Depression     Last assessed 2017    • Fatigue     Last assessed 2017    • Obesity (BMI 30-39 9) 2022   • Refused influenza vaccine 2020   • Varicella     Vaccinated     Past Surgical History:   Procedure Laterality Date   • RHINOPLASTY      deviated septum   • WISDOM TOOTH EXTRACTION       Social History   Social History     Substance and Sexual Activity   Alcohol Use Not Currently     Social History     Substance and Sexual Activity   Drug Use No     Social History     Tobacco Use   Smoking Status Never   Smokeless Tobacco Never     E-Cigarette/Vaping   • E-Cigarette Use Never User      E-Cigarette/Vaping Substances   • Nicotine No    • THC No    • CBD No    • Flavoring No    • Other No    • Unknown No      Family History:   Family History   Problem Relation Age of Onset   • Diabetes Mother         type 2 diabetes   • No Known Problems Father    • No Known Problems Brother    • Heart defect Brother        Meds/Allergies   PTA meds:   Prior to Admission Medications   Prescriptions Last Dose Informant Patient Reported? Taking? Prenatal Multivit-Min-Fe-FA (PRE-SUJATHA FORMULA PO) 3/13/2023 at 0730  Yes Yes   Sig: Take by mouth   ferrous sulfate 324 (65 Fe) mg 3/12/2023 at 0730  No Yes   Sig: Take 1 tablet (324 mg total) by mouth every other day      Facility-Administered Medications: None     Allergies   Allergen Reactions   • Amoxil [Amoxicillin] Rash   • Penicillins Rash     ALLERGIC TO ALL THE 'CILLINS       Objective   Vitals: Blood pressure 120/74, pulse 85, temperature 98 6 °F (37 °C), temperature source Oral, resp  rate 18, height 5' 5" (1 651 m), weight 94 8 kg (209 lb), last menstrual period 2022, SpO2 99 %  Body mass index is 34 78 kg/m²      Invasive Devices     Peripheral Intravenous Line  Duration           Peripheral IV 23 Left;Proximal;Ventral (anterior) Forearm <1 day                Physical Exam  Vitals and nursing note reviewed  Constitutional:       General: She is not in acute distress  Appearance: She is well-developed  HENT:      Head: Normocephalic and atraumatic  Eyes:      Conjunctiva/sclera: Conjunctivae normal    Cardiovascular:      Rate and Rhythm: Normal rate and regular rhythm  Heart sounds: No murmur heard  Pulmonary:      Effort: Pulmonary effort is normal  No respiratory distress  Breath sounds: Normal breath sounds  Abdominal:      General: There is distension (gravid)  Palpations: Abdomen is soft  Tenderness: There is no abdominal tenderness  Genitourinary:     General: Normal vulva  Musculoskeletal:         General: No swelling  Cervical back: Neck supple  Skin:     General: Skin is warm and dry  Capillary Refill: Capillary refill takes less than 2 seconds  Neurological:      Mental Status: She is alert  Psychiatric:         Mood and Affect: Mood normal          Prenatal Labs: I have personally reviewed pertinent reports        Blood Type:   Lab Results   Component Value Date/Time    ABO Grouping O 03/13/2023 11:53 PM       D (Rh type):   Lab Results   Component Value Date/Time    Rh Factor Positive 03/13/2023 11:53 PM       Antibody Screen: Negative    HCT/HGB:   Lab Results   Component Value Date/Time    Hematocrit 32 0 (L) 03/13/2023 11:53 PM    Hemoglobin 10 2 (L) 03/13/2023 11:53 PM        MCV:   Lab Results   Component Value Date/Time    MCV 76 (L) 03/13/2023 11:53 PM        Platelets:   Lab Results   Component Value Date/Time    Platelets 757 59/59/8275 11:53 PM        1 hour Glucola:   Lab Results   Component Value Date/Time    Glucose 88 12/16/2022 09:24 AM     Varicella:   Lab Results   Component Value Date/Time    Varicella IgG NON-IMMUNE (A) 08/23/2022 08:42 AM         Rubella:   Lab Results   Component Value Date/Time    Rubella IgG Quant 36 9 08/23/2022 08:42 AM          VDRL/RPR:   Lab Results   Component Value Date/Time    RPR Non-Reactive 12/16/2022 09:24 AM        Urine Culture/Screen:   Lab Results   Component Value Date/Time    Urine Culture >100,000 cfu/ml Lactobacillus (A) 01/03/2023 06:53 AM         Hep B:   Lab Results   Component Value Date/Time    Hepatitis B Surface Ag Non-reactive 08/23/2022 08:42 AM       Hep C: Non-reactive       HIV:   Lab Results   Component Value Date/Time    HIV-1/HIV-2 Ab Non-Reactive 08/23/2022 08:42 AM       Chlamydia: Negative      Gonorrhea:   Lab Results   Component Value Date/Time    N gonorrhoeae, DNA Probe Negative 02/15/2023 09:10 AM       Group B Strep:    Lab Results   Component Value Date/Time    Strep Grp B PCR Negative 02/15/2023 09:10 AM          Imaging, EKG, Pathology, and Other Studies: I have personally reviewed pertinent reports          Scarlet Whatley MD  1:32 AM  03/14/23

## 2023-03-14 NOTE — OB LABOR/OXYTOCIN SAFETY PROGRESS
Labor Progress Note - Laura Massed 24 y o  female MRN: [id number]    Unit/Bed#: -01 Encounter: [id number]       Contraction Frequency (minutes): 1-3  Contraction Quality: Mild      Cervical Dilation: 4        Cervical Effacement: 80  Fetal Station: -2  Baseline Rate: 125 bpm  Fetal Heart Rate: 131 BPM  FHR Category: Category I               Vital Signs:   Vitals:    03/13/23 2229   BP: 120/74   Pulse: 85   Resp: 18   Temp: 98 6 °F (37 °C)   SpO2: 99%       Notes/comments: Dorado balloon out  SVE as above  FHT Cat I  Plan to start pitocin titration       D/w Dr Bertram Cowden, MD 3/14/2023 6:05 AM

## 2023-03-14 NOTE — OB LABOR/OXYTOCIN SAFETY PROGRESS
Oxytocin Safety Progress Check Note - Marlin Oneill 24 y o  female MRN: [id number]    Unit/Bed#: -01 Encounter: [id number]    Dose (alhaji-units/min) Oxytocin: 10 alhaji-units/min  Contraction Frequency (minutes): 1-2 5  Contraction Quality: Mild  Tachysystole: No   Cervical Dilation: 4        Cervical Effacement: 80  Fetal Station: -2  Baseline Rate: 135 bpm  Fetal Heart Rate: 131 BPM  FHR Category: Category I               Vital Signs:   Vitals:    03/14/23 1105   BP: 116/73   Pulse: 65   Resp:    Temp:    SpO2:        Notes/comments:   SVE as above  Patient is currently comfortable with epidural     AROM for clear fluid  Continue pitocin titration    Discussed with Dr Bhanu Mccormack MD 3/14/2023 11:17 AM

## 2023-03-14 NOTE — PLAN OF CARE
Problem: BIRTH - VAGINAL/ SECTION  Goal: Fetal and maternal status remain reassuring during the birth process  Description: INTERVENTIONS:  - Monitor vital signs  - Monitor fetal heart rate  - Monitor uterine activity  - Monitor labor progression (vaginal delivery)  - DVT prophylaxis  - Antibiotic prophylaxis  Outcome: Progressing  Goal: Emotionally satisfying birthing experience for mother/fetus  Description: Interventions:  - Assess, plan, implement and evaluate the nursing care given to the patient in labor  - Advocate the philosophy that each childbirth experience is a unique experience and support the family's chosen level of involvement and control during the labor process   - Actively participate in both the patient's and family's teaching of the birth process  - Consider cultural, Latter-day and age-specific factors and plan care for the patient in labor  Outcome: Progressing     Problem: Knowledge Deficit  Goal: Patient/family/caregiver demonstrates understanding of disease process, treatment plan, medications, and discharge instructions  Description: Complete learning assessment and assess knowledge base  Interventions:  - Provide teaching at level of understanding  - Provide teaching via preferred learning methods  Outcome: Progressing  Goal: Verbalizes understanding of labor plan  Description: Assess patient/family/caregiver's baseline knowledge level and ability to understand information  Provide education via patient/family/caregiver's preferred learning method at appropriate level of understanding  1  Provide teaching at level of understanding  2  Provide teaching via preferred learning method(s)    Outcome: Progressing     Problem: DISCHARGE PLANNING  Goal: Discharge to home or other facility with appropriate resources  Description: INTERVENTIONS:  - Identify barriers to discharge w/patient and caregiver  - Arrange for needed discharge resources and transportation as appropriate  - Identify discharge learning needs (meds, wound care, etc )  - Arrange for interpretive services to assist at discharge as needed  - Refer to Case Management Department for coordinating discharge planning if the patient needs post-hospital services based on physician/advanced practitioner order or complex needs related to functional status, cognitive ability, or social support system  Outcome: Progressing     Problem: Labor & Delivery  Goal: Manages discomfort  Description: Assess and monitor for signs and symptoms of discomfort  Assess patient's pain level regularly and per hospital policy  Administer medications as ordered  Support use of nonpharmacological methods to help control pain such as distraction, imagery, relaxation, and application of heat and cold  Collaborate with interdisciplinary team and patient to determine appropriate pain management plan  1  Include patient in decisions related to comfort  2  Offer non-pharmacological pain management interventions  3  Report ineffective pain management to physician  Outcome: Progressing  Goal: Patient vital signs are stable  Description: 1  Assess vital signs - vaginal delivery    Outcome: Progressing

## 2023-03-14 NOTE — ASSESSMENT & PLAN NOTE
POD #2  Recovering well   Encourage Ambulation  Encourage breastfeeding  GBS -   Rh +    Successful urination, no BM, passing flatus  U-2cm

## 2023-03-15 RX ADMIN — WITCH HAZEL 1 PAD.: 500 SOLUTION RECTAL; TOPICAL at 00:19

## 2023-03-15 RX ADMIN — Medication 1 APPLICATION.: at 00:19

## 2023-03-15 RX ADMIN — IBUPROFEN 600 MG: 600 TABLET, FILM COATED ORAL at 17:49

## 2023-03-15 RX ADMIN — IBUPROFEN 600 MG: 600 TABLET, FILM COATED ORAL at 06:49

## 2023-03-15 RX ADMIN — DOCUSATE SODIUM 100 MG: 100 CAPSULE, LIQUID FILLED ORAL at 17:49

## 2023-03-15 RX ADMIN — IBUPROFEN 600 MG: 600 TABLET, FILM COATED ORAL at 00:18

## 2023-03-15 RX ADMIN — DOCUSATE SODIUM 100 MG: 100 CAPSULE, LIQUID FILLED ORAL at 00:18

## 2023-03-15 RX ADMIN — DOCUSATE SODIUM 100 MG: 100 CAPSULE, LIQUID FILLED ORAL at 08:29

## 2023-03-15 NOTE — ANESTHESIA POSTPROCEDURE EVALUATION
Post-Op Assessment Note    CV Status:  Stable    Pain management: adequate     Mental Status:  Alert and awake   Hydration Status:  Euvolemic   PONV Controlled:  Controlled   Airway Patency:  Patent      Post Op Vitals Reviewed: Yes      Staff: CRNA     Post-op block assessment: no complications, site cleaned and catheter intact      No notable events documented      BP   100/61   Temp      Pulse  73   Resp      SpO2

## 2023-03-15 NOTE — DISCHARGE SUMMARY
Discharge Summary - OB/GYN   Rene Chacon 24 y o  female MRN: [id number]  Unit/Bed#: -01 Encounter: [id number]      Admission Date: 3/13/2023     Discharge Date: 3/16/23    Admitting Diagnosis:   1  Pregnancy at 39w6d  2  Varicella non-immune  3  Family hx of congenital heart disease    Discharge Diagnosis:   Same, delivered      Procedures: spontaneous vaginal delivery    Delivering Attending: Aly Rojsa MD;Taurus*  Discharge Attending: Danii Sanchez MD    Hospital Course:     Rene Chacon is a 24 y o   at 39w6d wks who was initially admitted for elective induction of labor  She had cervical ripening with lei balloon and cytotec  She received pitocin titration and an epidural for pain control  She was AROM for clear fluid  She progressed to complete and started pushing  She delivered a viable female  on 3/14/23 at 2137  Weight 8lbs 0 4oz via spontaneous vaginal delivery  Apgars were 9 (1 min) and 9 (5 min)   was transferred to  nursery  Patient tolerated the procedure well and was transferred to recovery in stable condition  Her post-partum course was complicated by none  Her post-partum pain was well controlled with oral analgesics  On day of discharge, she was ambulating and able to reasonably perform all ADLs  She was voiding and had appropriate bowel function  Pain was well controlled  She was discharged home on post-partum day #2 without complications  Patient was instructed to follow up with her OB as an outpatient and was given appropriate warnings to call provider if she develops signs of infection or uncontrolled pain  Complications: none apparent    Condition at discharge: good     Discharge instructions/Information to patient and family:   See after visit summary for information provided to patient and family        Provisions for Follow-Up Care:  See after visit summary for information related to follow-up care and any pertinent home health orders  Disposition: Home    Planned Readmission: No    Discharge Medications: For a complete list of the patient's medications, please refer to her med rec

## 2023-03-15 NOTE — PROGRESS NOTES
Progress Note - OB/GYN  Danilo Rubio 24 y o  female MRN: [id number]  Unit/Bed#: -01 Encounter: [id number]    Assessment and Jake Wayne is a patient of: [facility]  She is PPD# 1 s/p  spontaneous vaginal delivery  Recovering well and is stable       Maternal varicella, non-immune  Assessment & Plan  Needs varicella vaccine postpartum    Family history of congenital heart disease  Assessment & Plan  Normal fetal echo 11/15/22    *  (spontaneous vaginal delivery)  Assessment & Plan  Recovering well   Encourage Ambulation  Encourage breastfeeding  GBS -   Rh +           Disposition    - Anticipate discharge home on PPD# 2      Subjective/Objective     Chief Complaint: Postpartum State     Subjective:    Danilo Rubio is PPD#1 s/p  spontaneous vaginal delivery  She has no current complaints  Pain is well controlled  Patient is currently voiding  She is ambulating  Patient is not currently passing flatus and has had no bowel movement  She is tolerating PO, and denies nausea or vomitting  Patient denies fever, chills, chest pain, shortness of breath, or calf tenderness  Lochia is normal  She is  Breastfeeding  She is recovering well and is stable  Vitals:   /61 (BP Location: Left arm)   Pulse 73   Temp 97 8 °F (36 6 °C) (Oral)   Resp 18   Ht 5' 5" (1 651 m)   Wt 94 8 kg (209 lb)   LMP 2022   SpO2 97%   Breastfeeding Unknown   BMI 34 78 kg/m²       Intake/Output Summary (Last 24 hours) at 3/15/2023 0531  Last data filed at 3/15/2023 0200  Gross per 24 hour   Intake 3518 96 ml   Output 1700 ml   Net 1818 96 ml       Invasive Devices     Peripheral Intravenous Line  Duration           Peripheral IV 23 Proximal;Ventral (anterior); Right Forearm 1 day          Epidural Line  Duration           Epidural Catheter 23 <1 day                Physical Exam:   GEN: Danilo Rubio appears well, alert and oriented x 3, pleasant and cooperative   CARDIO: RRR, no murmurs or rubs  RESP:  CTAB, no wheezes or rales  ABDOMEN: soft, no tenderness, no distention, fundus @ U-2  EXTREMITIES: non tender, no erythema  Labs:     Hemoglobin   Date Value Ref Range Status   03/13/2023 10 2 (L) 11 5 - 15 4 g/dL Final   01/03/2023 10 0 (L) 11 5 - 15 4 g/dL Final     WBC   Date Value Ref Range Status   03/13/2023 11 95 (H) 4 31 - 10 16 Thousand/uL Final   01/03/2023 11 66 (H) 4 31 - 10 16 Thousand/uL Final     Platelets   Date Value Ref Range Status   03/13/2023 322 149 - 390 Thousands/uL Final   01/03/2023 244 149 - 390 Thousands/uL Final     Creatinine   Date Value Ref Range Status   01/03/2023 0 28 (L) 0 60 - 1 30 mg/dL Final     Comment:     Standardized to IDMS reference method   10/14/2022 0 49 (L) 0 60 - 1 30 mg/dL Final     Comment:     Standardized to IDMS reference method     AST   Date Value Ref Range Status   01/03/2023 18 13 - 39 U/L Final     Comment:     Specimen collection should occur prior to Sulfasalazine administration due to the potential for falsely depressed results  10/14/2022 20 5 - 45 U/L Final     Comment:     Specimen collection should occur prior to Sulfasalazine administration due to the potential for falsely depressed results  ALT   Date Value Ref Range Status   01/03/2023 10 7 - 52 U/L Final     Comment:     Specimen collection should occur prior to Sulfasalazine administration due to the potential for falsely depressed results  10/14/2022 21 12 - 78 U/L Final     Comment:     Specimen collection should occur prior to Sulfasalazine and/or Sulfapyridine administration due to the potential for falsely depressed results             Rigo Elmore MD  3/15/2023  5:31 AM

## 2023-03-15 NOTE — LACTATION NOTE
This note was copied from a baby's chart  CONSULT - LACTATION  Baby Girl Nay Silverman [facility] 1 days female MRN: [id number]    [facility] Room / Bed: (N)/ 313(N) Encounter: [id number]    Maternal Information     MOTHER:  Jania De Santiago  Maternal Age: 24 y o    OB History: # 1 - Date: 23, Sex: Female, Weight: 3640 g (8 lb 0 4 oz), GA: 39w6d, Delivery: Vaginal, Spontaneous, Apgar1: 9, Apgar5: 9, Living: Living, Birth Comments: None   Previouse breast reduction surgery? No    Lactation history:   Has patient previously breast fed: No   How long had patient previously breast fed:     Previous breast feeding complications:       Past Surgical History:   Procedure Laterality Date   • RHINOPLASTY      deviated septum   • WISDOM TOOTH EXTRACTION          Birth information:  YOB: 2021   Time of birth: 9:37 PM   Sex: female   Delivery type: Vaginal, Spontaneous   Birth Weight: 3640 g (8 lb 0 4 oz)   Percent of Weight Change: 0%     Gestational Age: 37w11d   [unfilled]    Assessment     Breast and nipple assessment: normal assessment    [place] Assessment: normal assessment    Feeding assessment: feeding well  LATCH:  Latch: Grasps breast, tongue down, lips flanged, rhythmic sucking   Audible Swallowing: Spontaneous and intermittent (24 hours old)   Type of Nipple: Everted (After stimulation)   Comfort (Breast/Nipple): Soft/non-tender   Hold (Positioning): Partial assist, teach one side, mother does other, staff holds   LATCH Score: 9          Feeding recommendations:  breast feed on demand     Met with mother  Provided mother with Ready, Set, Baby booklet  Discussed Skin to Skin contact an benefits to mom and baby  Talked about the delay of the first bath until baby has adjusted  Spoke about the benefits of rooming in  Feeding on cue and what that means for recognizing infant's hunger  Avoidance of pacifiers for the first month discussed  Talked about exclusive breastfeeding for the first 6 months  Positioning and latch reviewed as well as showing images of other feeding positions  Discussed the properties of a good latch in any position  Reviewed hand/manual expression  Discussed s/s that baby is getting enough milk and some s/s that breastfeeding dyad may need further help  Gave information on common concerns, what to expect the first few weeks after delivery, preparing for other caregivers, and how partners can help  Resources for support also provided  Information on hand expression given  Discussed benefits of knowing how to manually express breast including stimulating milk supply, softening nipple for latch and evacuating breast in the event of engorgement  Discussed 2nd night syndrome and ways to calm infant  Hand out given  Provided DC booklet at this time, enc family to review and prepare questions for day of DC  Assisted parents to place baby skin to skin in football hold  Discussed importance of alignment of baby's ear, shoulder, and hip in any preferred position  Worked on supporting baby at breast level and beginning the feed with baby's nose arriving at the nipple  Then, using areolar compression while guiding baby chin-forward to the breast to achieve a deep, comfortable latch  Hand expression demonstrated and offered to the baby  Baby Rylee gapes and latches with a wide mouth, and rocker sucking motion  Mom reports strong tugging, and swallows noted  Discussed demand feedings encouraged family to continue watching her cues and feed when she shows signs of interest  No need to offer additional milk at this time  Call for lactation support as needed  Discussed supply demand concept with milk, and importance of continued breast stimulation around the clock       Reviewed signs of effective breastfeeding: audible swallows, strong but comfortable tugging while latched, breasts softening (after milk comes in), baby falling asleep and releasing the breast, and meeting daily diaper goals  Mom has a breast pump at home  Discussed flange fit at this time       Shanae Omer RN 3/15/2023 5:11 PM

## 2023-03-15 NOTE — PLAN OF CARE
Problem: DISCHARGE PLANNING  Goal: Discharge to home or other facility with appropriate resources  Description: INTERVENTIONS:  - Identify barriers to discharge w/patient and caregiver  - Arrange for needed discharge resources and transportation as appropriate  - Identify discharge learning needs (meds, wound care, etc )  - Arrange for interpretive services to assist at discharge as needed  - Refer to Case Management Department for coordinating discharge planning if the patient needs post-hospital services based on physician/advanced practitioner order or complex needs related to functional status, cognitive ability, or social support system  Outcome: Progressing     Problem: POSTPARTUM  Goal: Experiences normal postpartum course  Description: INTERVENTIONS:  - Monitor maternal vital signs  - Assess uterine involution and lochia  Outcome: Progressing  Goal: Appropriate maternal -  bonding  Description: INTERVENTIONS:  - Identify family support  - Assess for appropriate maternal/infant bonding   -Encourage maternal/infant bonding opportunities  - Referral to  or  as needed  Outcome: Progressing  Goal: Establishment of infant feeding pattern  Description: INTERVENTIONS:  - Assess breast/bottle feeding  - Refer to lactation as needed  Outcome: Progressing  Goal: Incision(s), wounds(s) or drain site(s) healing without S/S of infection  Description: INTERVENTIONS  - Assess and document dressing, incision, wound bed, drain sites and surrounding tissue  - Provide patient and family education  - Perform skin care/dressing changes every Outcome: Progressing

## 2023-03-15 NOTE — OB LABOR/OXYTOCIN SAFETY PROGRESS
Oxytocin Safety Progress Check Note - Ashley Sotelo 24 y o  female MRN: [id number]    Unit/Bed#: -01 Encounter: [id number]    Dose (alhaji-units/min) Oxytocin: 20 alhaji-units/min  Contraction Frequency (minutes): 1 5-4  Contraction Quality: Moderate  Tachysystole: No   Cervical Dilation: 10  Dilation Complete Date: 03/14/23  Dilation Complete Time: 2009  Cervical Effacement: 100  Fetal Station: 2  Baseline Rate: 130 bpm  Fetal Heart Rate: 133 BPM  FHR Category: Category I               Vital Signs:   Vitals:    03/14/23 1921   BP: 114/75   Pulse: 84   Resp: 14   Temp: 98 8 °F (37 1 °C)   SpO2:        Notes/comments:   Complete and +2 station  Pushing with great effort  Cat 1 tracing  Continue pushing        Gerda Mckenzie DO 3/14/2023 8:18 PM

## 2023-03-15 NOTE — PLAN OF CARE
Problem: DISCHARGE PLANNING  Goal: Discharge to home or other facility with appropriate resources  Description: INTERVENTIONS:  - Identify barriers to discharge w/patient and caregiver  - Arrange for needed discharge resources and transportation as appropriate  - Identify discharge learning needs (meds, wound care, etc )  - Arrange for interpretive services to assist at discharge as needed  - Refer to Case Management Department for coordinating discharge planning if the patient needs post-hospital services based on physician/advanced practitioner order or complex needs related to functional status, cognitive ability, or social support system  Outcome: Progressing     Problem: POSTPARTUM  Goal: Experiences normal postpartum course  Description: INTERVENTIONS:  - Monitor maternal vital signs  - Assess uterine involution and lochia  3/15/2023 0139 by Torrey Burciaga RN  Outcome: Progressing  3/15/2023 0139 by Torrey Burciaga RN  Outcome: Progressing  Goal: Appropriate maternal -  bonding  Description: INTERVENTIONS:  - Identify family support  - Assess for appropriate maternal/infant bonding   -Encourage maternal/infant bonding opportunities  - Referral to  or  as needed  3/15/2023 0139 by Torrey Burciaga RN  Outcome: Progressing  3/15/2023 0139 by Torrey Burciaga RN  Outcome: Progressing  Goal: Establishment of infant feeding pattern  Description: INTERVENTIONS:  - Assess breast/bottle feeding  - Refer to lactation as needed  3/15/2023 0139 by Torrey Burciaga RN  Outcome: Progressing  3/15/2023 0139 by Torrey Burciaga RN  Outcome: Progressing

## 2023-03-15 NOTE — L&D DELIVERY NOTE
Vaginal Delivery Summary - OB/GYN   Huan Napier 24 y o  female MRN: [id number]  Unit/Bed#: -01 Encounter: [id number]          Predelivery Diagnosis:  1  Pregnancy at 39w6d wks for induction of labor  2  Varicella non-immune  3  Family history of congenital heart disease    Postdelivery Diagnosis:  1  Same as above  2  Delivery of term     Procedure: normal spontaneous vaginal delivery    Attending: Cely Vergara    Resident: Goldy Bender MD    Medical Student: Deniz Oswald MS3    Anesthesia: epidural    QBL: Quantified blood loss (mL): 720    Complications: None    Specimens: cord blood, arterial and venous cord blood gasses, placenta (storage)    Cord Gas:   Recent Results (from the past 1 hour(s))   CORD, Blood gas, arterial    Collection Time: 23  9:43 PM   Result Value Ref Range    pH, Cord Art 7 351 7 230 - 7 430    pCO2, Cord Art 38 6 30 0 - 60 0    pO2, Cord Art 23 6 5 0 - 25 0 mm HG    HCO3, Cord Art 20 9 17 3 - 27 3 mmol/L    Base Exc, Cord Art -4 2 (L) 3 0 - 11 0 mmol/L    O2 Content, Cord Art 11 8 ml/dl    O2 Hgb, Arterial Cord 55 0 %   CORD, Blood gas, venous    Collection Time: 23  9:43 PM   Result Value Ref Range    pH, Cord Oscar 7 361 7 190 - 7 490    pCO2, Cord Oscar 39 0 27 0 - 43 0 mm HG    pO2, Cord Oscar 22 9 15 0 - 45 0 mm HG    HCO3, Cord Oscar 21 6 12 2 - 28 6 mmol/L    Base Exc, Cord Oscar -3 4 (L) 1 0 - 9 0 mmol/L    O2 Cont, Cord Oscar 11 9 mL/dL    O2 HGB,VENOUS CORD 55 4 %       Findings:   1  Viable female at 2137, with APGARS of 8 and 9 at 1 and 5 minutes respectively, Weight not available at time of dictation  2  Intact placenta with 3VC at 2142  3  1 degree laceration repaired with 3 0 vicryl on a CT-1 needle  Disposition:  Patient tolerated the procedure well and was recovering in labor and delivery room     Brief history and labor course:  Ms Huan Napier is a 24 y o  , now , who presented at 39wks for elective induction of labor   She was admitted to labor and delivery and underwent cervical ripening with lei balloon and misoprostol  She then was started on pitocin  She underwent epidural anesthesia and artificial rupture of membranes  She progressed to complete dilation with reassuring fetal heart tones throughout her labor course  Description of procedure    After pushing for 123 minutes, the patient delivered a viable female  at 2137  The fetal vertex delivered spontaneously  There was no evidence for nuchal cord  The anterior shoulder delivered atraumatically with maternal expulsive forces and the assistance of gentle steady downward guidance  The posterior shoulder delivered with maternal expulsive forces and the assistance of gentle steady upward guidance followed by the remainder of the infant body  Upon delivery, the infant was placed on the mothers abdomen and the cord was clamped and cut after a 30-60 second delay  Awaiting nurse resuscitators evaluated the   Arterial and venous cord blood gases and cord blood was collected for analysis  These were promptly sent to the lab  In the immediate post-partum, 30 units of IV pitocin was administered, and the uterus was noted to contract down well with massage and pitocin  The placenta delivered spontaneously at 214 and was noted to have a centrally inserted 3 vessel cord  The uterus was massaged until firm and the lower uterine segment was cleared of all clot and debris  The vagina, cervix, perineum, and rectum were inspected and there was noted to be a 1st degree perineal laceration  This was repaired using 3-0 vicryl suture in the usual fashion  After repair the laceration was noted to be hemostatic  During suture repair, bleeding from above picked up but then slowed with bimanual massage and increased rate of pitocin IV  Bleeding remained minimal for the rest of the procedure       At the conclusion of the procedure, all needle, sponge, and instrument counts were noted to be correct  Patient tolerated the procedure well  I was present and participated in all key portions of the case

## 2023-03-16 VITALS
SYSTOLIC BLOOD PRESSURE: 118 MMHG | HEART RATE: 68 BPM | BODY MASS INDEX: 34.82 KG/M2 | RESPIRATION RATE: 18 BRPM | TEMPERATURE: 98.5 F | WEIGHT: 209 LBS | HEIGHT: 65 IN | DIASTOLIC BLOOD PRESSURE: 78 MMHG | OXYGEN SATURATION: 98 %

## 2023-03-16 RX ORDER — DIAPER,BRIEF,INFANT-TODD,DISP
1 EACH MISCELLANEOUS DAILY PRN
Qty: 30 G | Refills: 0
Start: 2023-03-16

## 2023-03-16 RX ORDER — DOCUSATE SODIUM 100 MG/1
100 CAPSULE, LIQUID FILLED ORAL 2 TIMES DAILY
Refills: 0
Start: 2023-03-16

## 2023-03-16 RX ORDER — ONDANSETRON 2 MG/ML
4 INJECTION INTRAMUSCULAR; INTRAVENOUS EVERY 8 HOURS PRN
Refills: 0
Start: 2023-03-16

## 2023-03-16 RX ORDER — IBUPROFEN 600 MG/1
600 TABLET ORAL EVERY 6 HOURS
Qty: 30 TABLET | Refills: 0
Start: 2023-03-16

## 2023-03-16 RX ORDER — ACETAMINOPHEN 325 MG/1
650 TABLET ORAL EVERY 4 HOURS PRN
Refills: 0
Start: 2023-03-16

## 2023-03-16 RX ORDER — CALCIUM CARBONATE 200(500)MG
1000 TABLET,CHEWABLE ORAL 3 TIMES DAILY PRN
Refills: 0
Start: 2023-03-16

## 2023-03-16 RX ADMIN — ACETAMINOPHEN 650 MG: 325 TABLET ORAL at 05:11

## 2023-03-16 RX ADMIN — DOCUSATE SODIUM 100 MG: 100 CAPSULE, LIQUID FILLED ORAL at 10:53

## 2023-03-16 RX ADMIN — IBUPROFEN 600 MG: 600 TABLET, FILM COATED ORAL at 06:10

## 2023-03-16 NOTE — PLAN OF CARE
Problem: DISCHARGE PLANNING  Goal: Discharge to home or other facility with appropriate resources  Description: INTERVENTIONS:  - Identify barriers to discharge w/patient and caregiver  - Arrange for needed discharge resources and transportation as appropriate  - Identify discharge learning needs (meds, wound care, etc )  - Arrange for interpretive services to assist at discharge as needed  - Refer to Case Management Department for coordinating discharge planning if the patient needs post-hospital services based on physician/advanced practitioner order or complex needs related to functional status, cognitive ability, or social support system  Outcome: Completed     Problem: POSTPARTUM  Goal: Experiences normal postpartum course  Description: INTERVENTIONS:  - Monitor maternal vital signs  - Assess uterine involution and lochia  Outcome: Completed  Goal: Appropriate maternal -  bonding  Description: INTERVENTIONS:  - Identify family support  - Assess for appropriate maternal/infant bonding   -Encourage maternal/infant bonding opportunities  - Referral to  or  as needed  Outcome: Completed  Goal: Establishment of infant feeding pattern  Description: INTERVENTIONS:  - Assess breast/bottle feeding  - Refer to lactation as needed  Outcome: Completed  Goal: Incision(s), wounds(s) or drain site(s) healing without S/S of infection  Description: INTERVENTIONS  - Assess and document dressing, incision, wound bed, drain sites and surrounding tissue  - Provide patient and family education  - Perform skin care/dressing changes every   Outcome: Completed

## 2023-03-16 NOTE — PROGRESS NOTES
[facility]  Progress Note - Ashley Sotelo 1995, 24 y o  female MRN: [id number]  Unit/Bed#: -01 Encounter: [id number]  Primary Care Provider: Allison Hilario MD   Date and time admitted to hospital: 3/13/2023  9:23 PM      Progress Note - OB/GYN  Ashley Sotelo 24 y o  female MRN: [id number]  Unit/Bed#: -01 Encounter: [id number]    Assessment and Jake Wayne is a patient of: [facility]  She is PPD# 2 s/p  spontaneous vaginal delivery  Recovering well and is stable       *  (spontaneous vaginal delivery)  Assessment & Plan  POD #2  Recovering well   Encourage Ambulation  Encourage breastfeeding  GBS -   Rh +    Successful urination, no BM, passing flatus  U-2cm        Maternal varicella, non-immune  Assessment & Plan  Needs varicella vaccine postpartum; will be administered day of discharge    Family history of congenital heart disease  Assessment & Plan  Normal fetal echo 11/15/22      Disposition    - Anticipate discharge home on PPD# 2 or 3      Subjective/Objective     Chief Complaint: Postpartum State     Subjective:    Ashley Sotelo is PPD/POD#2 s/p  spontaneous vaginal delivery  She has no current complaints  Pain is well controlled  Patient is currently voiding  She is ambulating  Patient is currently passing flatus and has had no bowel movement  She is tolerating PO, and denies nausea or vomitting  Patient denies fever, chills, chest pain, shortness of breath, or calf tenderness  Lochia is normal  She is  Breastfeeding  She is recovering well and is stable         Vitals:   /71 (BP Location: Right arm)   Pulse 78   Temp 98 3 °F (36 8 °C) (Oral)   Resp 18   Ht 5' 5" (1 651 m)   Wt 94 8 kg (209 lb)   LMP 2022   SpO2 98%   Breastfeeding Yes   BMI 34 78 kg/m²     No intake or output data in the 24 hours ending 23 0641    Invasive Devices     Peripheral Intravenous Line  Duration Peripheral IV 03/13/23 Proximal;Ventral (anterior); Right Forearm 2 days                Physical Exam:   GEN: Kimberlee Dior appears well, alert and oriented x 3, pleasant and cooperative   CARDIO: RRR, no murmurs or rubs  RESP:  CTAB, no wheezes or rales  ABDOMEN: soft, no tenderness, no distention, fundus @ U-2cm  EXTREMITIES: SCDs on, non tender, no erythema, b/l Weston's sign negative      Labs:     Hemoglobin   Date Value Ref Range Status   03/13/2023 10 2 (L) 11 5 - 15 4 g/dL Final   01/03/2023 10 0 (L) 11 5 - 15 4 g/dL Final     WBC   Date Value Ref Range Status   03/13/2023 11 95 (H) 4 31 - 10 16 Thousand/uL Final   01/03/2023 11 66 (H) 4 31 - 10 16 Thousand/uL Final     Platelets   Date Value Ref Range Status   03/13/2023 322 149 - 390 Thousands/uL Final   01/03/2023 244 149 - 390 Thousands/uL Final     Creatinine   Date Value Ref Range Status   01/03/2023 0 28 (L) 0 60 - 1 30 mg/dL Final     Comment:     Standardized to IDMS reference method   10/14/2022 0 49 (L) 0 60 - 1 30 mg/dL Final     Comment:     Standardized to IDMS reference method     AST   Date Value Ref Range Status   01/03/2023 18 13 - 39 U/L Final     Comment:     Specimen collection should occur prior to Sulfasalazine administration due to the potential for falsely depressed results  10/14/2022 20 5 - 45 U/L Final     Comment:     Specimen collection should occur prior to Sulfasalazine administration due to the potential for falsely depressed results  ALT   Date Value Ref Range Status   01/03/2023 10 7 - 52 U/L Final     Comment:     Specimen collection should occur prior to Sulfasalazine administration due to the potential for falsely depressed results  10/14/2022 21 12 - 78 U/L Final     Comment:     Specimen collection should occur prior to Sulfasalazine and/or Sulfapyridine administration due to the potential for falsely depressed results             Harman Leyva MD  3/16/2023  6:41 AM

## 2023-03-16 NOTE — LACTATION NOTE
This note was copied from a baby's chart  CONSULT - LACTATION  Baby Girl Flavioayne NullMarysol Mcallister 2 days female MRN: [id number]    [facility] NURSERY Room / Bed: (N)/(N) Encounter: [id number]    Maternal Information     MOTHER:  Riki Hampton  Maternal Age: 24 y o    OB History: # 1 - Date: 03/14/23, Sex: Female, Weight: 3640 g (8 lb 0 4 oz), GA: 39w6d, Delivery: Vaginal, Spontaneous, Apgar1: 9, Apgar5: 9, Living: Living, Birth Comments: None   Previouse breast reduction surgery? No    Lactation history:   Has patient previously breast fed: No   How long had patient previously breast fed:     Previous breast feeding complications:       Past Surgical History:   Procedure Laterality Date   • RHINOPLASTY      deviated septum   • WISDOM TOOTH EXTRACTION          Birth information:  YOB: 2021   Time of birth: 9:37 PM   Sex: female   Delivery type: Vaginal, Spontaneous   Birth Weight: 3640 g (8 lb 0 4 oz)   Percent of Weight Change: -3%     Gestational Age: 39w6d      03/16/23 0900   Lactation Consultation   Reason for Consult 10 minutes   Breasts/Nipples   Left Breast Soft   Right Breast Soft   Breastfeeding Status Yes   Breastfeeding Progress Breastfeeding well  (Per Favian Rizo)   Patient Follow-Up   Lactation Consult Status 2   Follow-Up Type Inpatient;Call as needed   Other OB Lactation Documentation    Additional Problem Noted Supported breastfeeding discharge instructions  Verbalized understanding of where to find information if needeed  Denies fullness, denies pain or bleeding from breast/nipples  Feeding recommendations:  breast feed on demand     Parents say Rylee is latching well and has evidence of breast milk in her mouth when she is done feeding  Encouraged parents to call for assistance, questions, and concerns about breastfeeding  Extension provided        Loretta Garza RN 3/16/2023 9:54 AM

## 2023-03-21 LAB — PLACENTA IN STORAGE: NORMAL

## 2023-04-27 ENCOUNTER — POSTPARTUM VISIT (OUTPATIENT)
Dept: OBGYN CLINIC | Facility: CLINIC | Age: 28
End: 2023-04-27

## 2023-04-27 VITALS
DIASTOLIC BLOOD PRESSURE: 76 MMHG | BODY MASS INDEX: 33.32 KG/M2 | HEIGHT: 65 IN | SYSTOLIC BLOOD PRESSURE: 104 MMHG | WEIGHT: 200 LBS

## 2023-04-27 DIAGNOSIS — Z30.011 ENCOUNTER FOR INITIAL PRESCRIPTION OF CONTRACEPTIVE PILLS: ICD-10-CM

## 2023-04-27 PROBLEM — Z82.79 FAMILY HISTORY OF CONGENITAL HEART DISEASE: Status: RESOLVED | Noted: 2022-11-02 | Resolved: 2023-04-27

## 2023-04-27 PROBLEM — O21.9 NAUSEA AND VOMITING DURING PREGNANCY: Status: RESOLVED | Noted: 2022-09-14 | Resolved: 2023-04-27

## 2023-04-27 PROBLEM — Z28.39 MATERNAL VARICELLA, NON-IMMUNE: Status: RESOLVED | Noted: 2023-03-13 | Resolved: 2023-04-27

## 2023-04-27 PROBLEM — O99.210 OBESITY IN PREGNANCY, ANTEPARTUM: Status: RESOLVED | Noted: 2023-01-20 | Resolved: 2023-04-27

## 2023-04-27 PROBLEM — O09.899 MATERNAL VARICELLA, NON-IMMUNE: Status: RESOLVED | Noted: 2023-03-13 | Resolved: 2023-04-27

## 2023-04-27 RX ORDER — NORETHINDRONE ACETATE AND ETHINYL ESTRADIOL 1MG-20(21)
1 KIT ORAL DAILY
Qty: 84 TABLET | Refills: 4 | Status: SHIPPED | OUTPATIENT
Start: 2023-04-27

## 2024-04-08 ENCOUNTER — OFFICE VISIT (OUTPATIENT)
Dept: URGENT CARE | Age: 29
End: 2024-04-08
Payer: COMMERCIAL

## 2024-04-08 VITALS
RESPIRATION RATE: 18 BRPM | HEART RATE: 108 BPM | TEMPERATURE: 101.1 F | DIASTOLIC BLOOD PRESSURE: 72 MMHG | OXYGEN SATURATION: 99 % | SYSTOLIC BLOOD PRESSURE: 134 MMHG

## 2024-04-08 DIAGNOSIS — J02.9 SORE THROAT: Primary | ICD-10-CM

## 2024-04-08 LAB — S PYO AG THROAT QL: NEGATIVE

## 2024-04-08 PROCEDURE — 99213 OFFICE O/P EST LOW 20 MIN: CPT

## 2024-04-08 PROCEDURE — 87070 CULTURE OTHR SPECIMN AEROBIC: CPT

## 2024-04-08 PROCEDURE — 87147 CULTURE TYPE IMMUNOLOGIC: CPT

## 2024-04-08 RX ORDER — IBUPROFEN 400 MG/1
400 TABLET ORAL ONCE
Status: COMPLETED | OUTPATIENT
Start: 2024-04-08 | End: 2024-04-08

## 2024-04-08 RX ORDER — BROMPHENIRAMINE MALEATE, PSEUDOEPHEDRINE HYDROCHLORIDE, AND DEXTROMETHORPHAN HYDROBROMIDE 2; 30; 10 MG/5ML; MG/5ML; MG/5ML
5 SYRUP ORAL 4 TIMES DAILY PRN
Qty: 120 ML | Refills: 0 | Status: SHIPPED | OUTPATIENT
Start: 2024-04-08

## 2024-04-08 RX ORDER — FLUTICASONE PROPIONATE 50 MCG
1 SPRAY, SUSPENSION (ML) NASAL DAILY
Qty: 9.9 ML | Refills: 0 | Status: SHIPPED | OUTPATIENT
Start: 2024-04-08

## 2024-04-08 RX ADMIN — IBUPROFEN 400 MG: 400 TABLET ORAL at 11:17

## 2024-04-08 NOTE — LETTER
April 8, 2024     Patient: Teetee Mahoney   YOB: 1995   Date of Visit: 4/8/2024       To Whom it May Concern:    Teetee Mahoney was seen in my clinic on 4/8/2024. She may return to work on 4/10/2024 or when fever free for 24hours without fever reducing medications .    If you have any questions or concerns, please don't hesitate to call.         Sincerely,          JEFF Caldwell        CC: No Recipients

## 2024-04-08 NOTE — PATIENT INSTRUCTIONS
In office strep test was negative today.  We will send you throat swab for culture.    Please monitor MyChart for your results.  If the results are positive, we will contact you to start antibiotics.     Please trial warm salt water gargles, Chloraseptic spray, Cepacol cough drops and warm tea with honey as needed for sore throat.      Tylenol and ibuprofen as needed for pain and fever.  Increase fluids  Rest.

## 2024-04-11 ENCOUNTER — TELEPHONE (OUTPATIENT)
Dept: URGENT CARE | Age: 29
End: 2024-04-11

## 2024-04-11 LAB — BACTERIA THROAT CULT: ABNORMAL

## 2024-04-11 NOTE — TELEPHONE ENCOUNTER
Patient had negative rapid strep, throat culture with 3+ nongroup a strep.  She is not currently on antibiotics.  Left voicemail, request callback for questions.    As it is not group A, would not require antibiotic and less her symptoms are not improving.

## 2024-05-13 ENCOUNTER — OFFICE VISIT (OUTPATIENT)
Age: 29
End: 2024-05-13
Payer: COMMERCIAL

## 2024-05-13 VITALS
DIASTOLIC BLOOD PRESSURE: 70 MMHG | HEART RATE: 76 BPM | OXYGEN SATURATION: 99 % | BODY MASS INDEX: 38.05 KG/M2 | SYSTOLIC BLOOD PRESSURE: 110 MMHG | WEIGHT: 228.4 LBS | HEIGHT: 65 IN | TEMPERATURE: 98.2 F

## 2024-05-13 DIAGNOSIS — R09.82 POST-NASAL DRIP: ICD-10-CM

## 2024-05-13 DIAGNOSIS — R05.8 COUGH WITH SPUTUM: Primary | ICD-10-CM

## 2024-05-13 PROCEDURE — 99214 OFFICE O/P EST MOD 30 MIN: CPT | Performed by: FAMILY MEDICINE

## 2024-05-13 RX ORDER — AZITHROMYCIN 250 MG/1
TABLET, FILM COATED ORAL
Qty: 6 TABLET | Refills: 0 | Status: SHIPPED | OUTPATIENT
Start: 2024-05-13 | End: 2024-05-17

## 2024-05-13 RX ORDER — BENZONATATE 200 MG/1
200 CAPSULE ORAL 3 TIMES DAILY PRN
Qty: 20 CAPSULE | Refills: 0 | Status: SHIPPED | OUTPATIENT
Start: 2024-05-13

## 2024-05-13 NOTE — PROGRESS NOTES
Name: Teetee Mahoney      : 1995      MRN: [id number]  Encounter Provider: Hemalatha Connelly MD  Encounter Date: 2024   Encounter department: [facility]    Assessment & Plan     1. Cough with sputum  -     azithromycin (ZITHROMAX) 250 mg tablet; Take 2 tablets today then 1 tablet daily x 4 days  -     benzonatate (TESSALON) 200 MG capsule; Take 1 capsule (200 mg total) by mouth 3 (three) times a day as needed for cough    2. Post-nasal drip  -     azithromycin (ZITHROMAX) 250 mg tablet; Take 2 tablets today then 1 tablet daily x 4 days    Patient with persistent with cough and post nasal drip with nasal drainage ongoing for 3 weeks.  Recommend therapy with allergy regimen and Z-pack.  Cough suppression with Tessalon prn.  FU PRN and return in a month for physical.         Subjective      Cough  This is a new problem. The current episode started 1 to 4 weeks ago (ongoing for 3 weeks). The problem has been unchanged. The problem occurs every few hours. The cough is Productive of sputum. Associated symptoms include headaches, nasal congestion and postnasal drip. Pertinent negatives include no chest pain, chills, ear pain, fever, rash, rhinorrhea, sore throat, shortness of breath or wheezing. The treatment provided no relief.     Review of Systems   Constitutional:  Negative for chills and fever.   HENT:  Positive for postnasal drip. Negative for ear pain, rhinorrhea and sore throat.    Respiratory:  Positive for cough. Negative for shortness of breath and wheezing.    Cardiovascular:  Negative for chest pain.   Skin:  Negative for rash.   Neurological:  Positive for headaches.       Current Outpatient Medications on File Prior to Visit   Medication Sig    acetaminophen (TYLENOL) 325 mg tablet Take 2 tablets (650 mg total) by mouth every 4 (four) hours as needed for mild pain    brompheniramine-pseudoephedrine-DM 30-2-10 MG/5ML syrup Take 5 mL by mouth 4 (four) times a day  "as needed for cough    fluticasone (FLONASE) 50 mcg/act nasal spray 1 spray into each nostril daily    norethindrone-ethinyl estradiol (Junel FE ) 1-20 MG-MCG per tablet Take 1 tablet by mouth daily    Prenatal Multivit-Min-Fe-FA (PRE-SUJATHA FORMULA PO) Take by mouth    ferrous sulfate 324 (65 Fe) mg Take 1 tablet (324 mg total) by mouth every other day    witch hazel-glycerin (TUCKS) topical pad Apply 1 pad. topically every 4 (four) hours as needed for irritation (Patient not taking: Reported on 2023)       Objective     /70   Pulse 76   Temp 98.2 °F (36.8 °C)   Ht 5' 5\" (1.651 m)   Wt 104 kg (228 lb 6.4 oz)   SpO2 99%   BMI 38.01 kg/m²     Physical Exam  Vitals and nursing note reviewed.   Constitutional:       General: She is not in acute distress.     Appearance: She is well-developed. She is not diaphoretic.   HENT:      Head: Normocephalic and atraumatic.      Right Ear: Tympanic membrane and ear canal normal. There is no impacted cerumen.      Left Ear: Tympanic membrane and ear canal normal. There is no impacted cerumen.      Nose: Congestion and rhinorrhea present.      Mouth/Throat:      Pharynx: Oropharynx is clear. No oropharyngeal exudate or posterior oropharyngeal erythema.   Eyes:      General: No scleral icterus.     Conjunctiva/sclera: Conjunctivae normal.   Cardiovascular:      Rate and Rhythm: Normal rate and regular rhythm.      Heart sounds: Normal heart sounds. No murmur heard.  Pulmonary:      Effort: Pulmonary effort is normal. No respiratory distress.      Breath sounds: Normal breath sounds. No wheezing.   Abdominal:      General: Bowel sounds are normal.      Palpations: Abdomen is soft.   Musculoskeletal:         General: Normal range of motion.      Cervical back: Normal range of motion and neck supple. No rigidity.      Right lower leg: No edema.      Left lower leg: No edema.   Lymphadenopathy:      Cervical: No cervical adenopathy.   Skin:     General: Skin is warm " and dry.      Coloration: Skin is not pale.   Neurological:      General: No focal deficit present.      Mental Status: She is alert and oriented to person, place, and time.   Psychiatric:         Behavior: Behavior normal.         Thought Content: Thought content normal.           Hemalatha Connelly MD

## 2024-06-07 ENCOUNTER — RA CDI HCC (OUTPATIENT)
Dept: OTHER | Facility: HOSPITAL | Age: 29
End: 2024-06-07

## 2024-06-25 ENCOUNTER — HOSPITAL ENCOUNTER (EMERGENCY)
Facility: HOSPITAL | Age: 29
Discharge: HOME | End: 2024-06-25
Attending: EMERGENCY MEDICINE
Payer: OTHER MISCELLANEOUS

## 2024-06-25 VITALS
TEMPERATURE: 98 F | RESPIRATION RATE: 18 BRPM | OXYGEN SATURATION: 98 % | HEART RATE: 72 BPM | DIASTOLIC BLOOD PRESSURE: 72 MMHG | SYSTOLIC BLOOD PRESSURE: 103 MMHG

## 2024-06-25 DIAGNOSIS — Z77.21 EXPOSURE TO BLOOD OR BODY FLUID: Primary | ICD-10-CM

## 2024-06-25 LAB
ALT SERPL W P-5'-P-CCNC: 11 U/L (ref 7–52)
HBV SURFACE AB SER-ACNC: 28 MIU/ML
HBV SURFACE AG SER QL: NORMAL
HCG SERPL QL: NEGATIVE
HCV AB SER QL: NORMAL
HIV 1+2 AB+HIV1 P24 AG SERPL QL IA: NORMAL
HIV 2 AB SERPL QL IA: NORMAL
HIV1 AB SERPL QL IA: NORMAL
HIV1 P24 AG SERPL QL IA: NORMAL

## 2024-06-25 PROCEDURE — 86803 HEPATITIS C AB TEST: CPT

## 2024-06-25 PROCEDURE — 87389 HIV-1 AG W/HIV-1&-2 AB AG IA: CPT

## 2024-06-25 PROCEDURE — 86706 HEP B SURFACE ANTIBODY: CPT

## 2024-06-25 PROCEDURE — 84460 ALANINE AMINO (ALT) (SGPT): CPT

## 2024-06-25 PROCEDURE — 87340 HEPATITIS B SURFACE AG IA: CPT

## 2024-06-25 PROCEDURE — 99284 EMERGENCY DEPT VISIT MOD MDM: CPT | Performed by: EMERGENCY MEDICINE

## 2024-06-25 PROCEDURE — 36415 COLL VENOUS BLD VENIPUNCTURE: CPT

## 2024-06-25 PROCEDURE — 84703 CHORIONIC GONADOTROPIN ASSAY: CPT

## 2024-06-25 NOTE — ED PROVIDER NOTES
History  Chief Complaint   Patient presents with    Body Fluid Exposure     Patient came in contact with a patients blood. No open areas.      29-year-old female presenting due to blood exposure.  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  No breaks in the skin, patient is up-to-date with hep B vaccinations.  Otherwise patient asymptomatic.        Prior to Admission Medications   Prescriptions Last Dose Informant Patient Reported? Taking?   Prenatal Multivit-Min-Fe-FA (PRE- FORMULA PO)  Self Yes No   Sig: Take by mouth   acetaminophen (TYLENOL) 325 mg tablet   No No   Sig: Take 2 tablets (650 mg total) by mouth every 4 (four) hours as needed for mild pain   benzonatate (TESSALON) 200 MG capsule   No No   Sig: Take 1 capsule (200 mg total) by mouth 3 (three) times a day as needed for cough   brompheniramine-pseudoephedrine-DM 30-2-10 MG/5ML syrup   No No   Sig: Take 5 mL by mouth 4 (four) times a day as needed for cough   ferrous sulfate 324 (65 Fe) mg  Self No No   Sig: Take 1 tablet (324 mg total) by mouth every other day   fluticasone (FLONASE) 50 mcg/act nasal spray   No No   Si spray into each nostril daily   norethindrone-ethinyl estradiol (Junel FE ) 1-20 MG-MCG per tablet   No No   Sig: Take 1 tablet by mouth daily   witch hazel-glycerin (TUCKS) topical pad   No No   Sig: Apply 1 pad. topically every 4 (four) hours as needed for irritation   Patient not taking: Reported on 2023      Facility-Administered Medications: None       Past Medical History:   Diagnosis Date    Anxiety     Last assessed 2017     Bursitis     Depression     Last assessed 2017     Fatigue     Last assessed 2017     Obesity (BMI 30-39.9) 2022    Refused influenza vaccine 2020    Varicella     Vaccinated       Past Surgical History:   Procedure Laterality Date    RHINOPLASTY       deviated septum    WISDOM TOOTH EXTRACTION         Family History   Problem Relation Age of Onset    Diabetes Mother         type 2 diabetes    No Known Problems Father     No Known Problems Brother     Heart defect Brother      I have reviewed and agree with the history as documented.    E-Cigarette/Vaping    E-Cigarette Use Never User      E-Cigarette/Vaping Substances    Nicotine No     THC No     CBD No     Flavoring No     Other No     Unknown No      Social History     Tobacco Use    Smoking status: Never    Smokeless tobacco: Never   Vaping Use    Vaping status: Never Used   Substance Use Topics    Alcohol use: Not Currently    Drug use: No        Review of Systems   Constitutional:  Negative for chills and fever.   HENT:  Negative for ear pain and sore throat.    Eyes:  Negative for pain and visual disturbance.   Respiratory:  Negative for cough and shortness of breath.    Cardiovascular:  Negative for chest pain and palpitations.   Gastrointestinal:  Negative for abdominal pain and vomiting.   Genitourinary:  Negative for dysuria and hematuria.   Musculoskeletal:  Negative for arthralgias and back pain.   Skin:  Negative for color change and rash.   Neurological:  Negative for seizures and syncope.   All other systems reviewed and are negative.      Physical Exam  ED Triage Vitals [06/25/24 0220]   Temperature Pulse Respirations Blood Pressure SpO2   98 °F (36.7 °C) 72 18 103/72 98 %      Temp src Heart Rate Source Patient Position - Orthostatic VS BP Location FiO2 (%)   -- Monitor -- -- --      Pain Score       --             Orthostatic Vital Signs  Vitals:    06/25/24 0220   BP: 103/72   Pulse: 72       Physical Exam  Vitals and nursing note reviewed.   Constitutional:       General: She is not in acute distress.     Appearance: She is well-developed.   HENT:      Head: Normocephalic and atraumatic.      Nose: Nose normal. No congestion.   Eyes:      Extraocular Movements: Extraocular movements intact.       Conjunctiva/sclera: Conjunctivae normal.   Cardiovascular:      Rate and Rhythm: Normal rate and regular rhythm.      Pulses: Normal pulses.      Heart sounds: Normal heart sounds. No murmur heard.  Pulmonary:      Effort: Pulmonary effort is normal. No respiratory distress.      Breath sounds: Normal breath sounds.   Chest:      Chest wall: No tenderness.   Abdominal:      General: Abdomen is flat. Bowel sounds are normal.      Palpations: Abdomen is soft.      Tenderness: There is no abdominal tenderness. There is no right CVA tenderness or left CVA tenderness.   Musculoskeletal:         General: No deformity or signs of injury. Normal range of motion.      Cervical back: Normal range of motion and neck supple. No rigidity or tenderness.   Skin:     General: Skin is warm and dry.      Findings: No bruising, lesion or rash.   Neurological:      General: No focal deficit present.      Mental Status: She is alert.      Cranial Nerves: No cranial nerve deficit.      Sensory: No sensory deficit.         ED Medications  Medications - No data to display    Diagnostic Studies  Results Reviewed       Procedure Component Value Units Date/Time    ALT [[id number]] Collected: 06/25/24 0355    Lab Status: In process Specimen: Blood from Arm, Left Updated: 06/25/24 0427    hCG, qualitative pregnancy [[id number]] Collected: 06/25/24 0355    Lab Status: In process Specimen: Blood from Arm, Left Updated: 06/25/24 0426    Hepatitis B surface antigen [[id number]] Collected: 06/25/24 0355    Lab Status: In process Specimen: Blood from Arm, Left Updated: 06/25/24 0424    Hepatitis C antibody [[id number]] Collected: 06/25/24 0355    Lab Status: In process Specimen: Blood from Arm, Left Updated: 06/25/24 0424    Hepatitis B surface antibody [[id number]] Collected: 06/25/24 0355    Lab Status: In process Specimen: Blood from Arm, Left Updated: 06/25/24 0424    HIV 1/2 AG/AB w Reflex [facility] for 2 yr old and above [[id number]] Collected:  06/25/24 0355    Lab Status: In process Specimen: Blood from Arm, Left Updated: 06/25/24 0424                   No orders to display         Procedures  Procedures      ED Course  ED Course as of 06/25/24 0448   Tue Jun 25, 2024   0340 29-year-old female presenting due to exposure to blood after another patient fell and she provided first-aid.  Will collect exposure panel and have patient follow-up with her family doctor for reassessment.  Patient is agreeable and appropriate for discharge, return precautions discussed.                             SBIRT 20yo+      Flowsheet Row Most Recent Value   Initial Alcohol Screen: US AUDIT-C     1. How often do you have a drink containing alcohol? 0 Filed at: 06/25/2024 0220   2. How many drinks containing alcohol do you have on a typical day you are drinking?  0 Filed at: 06/25/2024 0220   3b. FEMALE Any Age, or MALE 65+: How often do you have 4 or more drinks on one occassion? 0 Filed at: 06/25/2024 0220   Audit-C Score 0 Filed at: 06/25/2024 0220   MACKENZIE: How many times in the past year have you...    Used an illegal drug or used a prescription medication for non-medical reasons? Never Filed at: 06/25/2024 0220                  Medical Decision Making  Amount and/or Complexity of Data Reviewed  Labs: ordered.          Disposition  Final diagnoses:   Exposure to blood or body fluid     Time reflects when diagnosis was documented in both MDM as applicable and the Disposition within this note       Time User Action Codes Description Comment    6/25/2024  3:50 AM Willard Garcia [Z77.21] Exposure to blood or body fluid           ED Disposition       ED Disposition   Discharge    Condition   Stable    Date/Time   Tue Jun 25, 2024 0351    Comment   Teetee Mahoney discharge to home/self care.                   Follow-up Information       Follow up With Specialties Details Why Contact Info Additional Information    Hemalatha Connelly MD Family Medicine   [address]  [place] [address]  [phone]       [facility] Emergency Medicine   [address] [facility] [address]  [phone] [facility], [address] [place], [place], [address]            Discharge Medication List as of 2024  4:15 AM        CONTINUE these medications which have NOT CHANGED    Details   acetaminophen (TYLENOL) 325 mg tablet Take 2 tablets (650 mg total) by mouth every 4 (four) hours as needed for mild pain, Starting u 3/16/2023, No Print      benzonatate (TESSALON) 200 MG capsule Take 1 capsule (200 mg total) by mouth 3 (three) times a day as needed for cough, Starting 2024, Normal      brompheniramine-pseudoephedrine-DM 30-2-10 MG/5ML syrup Take 5 mL by mouth 4 (four) times a day as needed for cough, Starting 2024, Normal      ferrous sulfate 324 (65 Fe) mg Take 1 tablet (324 mg total) by mouth every other day, Starting e 1/3/2023, Until u 2023, Normal      fluticasone (FLONASE) 50 mcg/act nasal spray 1 spray into each nostril daily, Starting 2024, Normal      norethindrone-ethinyl estradiol (Junel FE ) 1-20 MG-MCG per tablet Take 1 tablet by mouth daily, Starting 2023, Normal      Prenatal Multivit-Min-Fe-FA (PRE- FORMULA PO) Take by mouth, Historical Med      witch hazel-glycerin (TUCKS) topical pad Apply 1 pad. topically every 4 (four) hours as needed for irritation, Starting Thu 3/16/2023, No Print           No discharge procedures on file.    PDMP Review         Value Time User    PDMP Reviewed  Yes 2024  3:32 AM Castro Sanchez MD             ED Provider  Attending physically available and evaluated Teetee Mahoney. I managed the patient along with the ED Attending.    Electronically Signed by           Willard Garcia MD  24 [phone]

## 2024-06-25 NOTE — DISCHARGE INSTRUCTIONS
Please follow-up with your family doctor for reassessment and discussion of results of your blood work.    Your blood work results will also be available on [facility].    Thank you for allowing us to take part in your care.

## 2024-06-26 ENCOUNTER — OFFICE VISIT (OUTPATIENT)
Dept: OBGYN CLINIC | Facility: CLINIC | Age: 29
End: 2024-06-26
Payer: COMMERCIAL

## 2024-06-26 VITALS
SYSTOLIC BLOOD PRESSURE: 108 MMHG | HEIGHT: 65 IN | BODY MASS INDEX: 37.92 KG/M2 | DIASTOLIC BLOOD PRESSURE: 74 MMHG | WEIGHT: 227.6 LBS

## 2024-06-26 DIAGNOSIS — N64.52 DISCHARGE FROM RIGHT NIPPLE: Primary | ICD-10-CM

## 2024-06-26 PROCEDURE — 99213 OFFICE O/P EST LOW 20 MIN: CPT | Performed by: PHYSICIAN ASSISTANT

## 2024-06-27 NOTE — PROGRESS NOTES
Assessment/Plan:      Diagnoses and all orders for this visit:    Discharge from right nipple  -     hCG, quantitative; Future  -     Prolactin; Future  -     TSH, 3rd generation with Free T4 reflex; Future          Subjective:     Patient ID: Teetee Mahoney is a 29 y.o. female.    Pt presents for a problem today  She complains of milky white  discharge from right nipple x 1 month, off and on  Started before last period and lasted through this period  LMP 6/23 (sxs started in May)  Periods are regular  Pt denies any mass or pain  No new meds  Overall feeling well  No HAs    Rx for BW            Review of Systems      Objective:     Physical Exam

## 2025-05-19 ENCOUNTER — OFFICE VISIT (OUTPATIENT)
Dept: BARIATRICS | Facility: CLINIC | Age: 30
End: 2025-05-19
Payer: COMMERCIAL

## 2025-05-19 VITALS
SYSTOLIC BLOOD PRESSURE: 120 MMHG | HEIGHT: 65 IN | WEIGHT: 236.2 LBS | BODY MASS INDEX: 39.35 KG/M2 | DIASTOLIC BLOOD PRESSURE: 78 MMHG | HEART RATE: 80 BPM | RESPIRATION RATE: 16 BRPM

## 2025-05-19 DIAGNOSIS — E66.01 CLASS 2 SEVERE OBESITY DUE TO EXCESS CALORIES WITH SERIOUS COMORBIDITY AND BODY MASS INDEX (BMI) OF 39.0 TO 39.9 IN ADULT (HCC): Primary | ICD-10-CM

## 2025-05-19 DIAGNOSIS — E78.5 HYPERLIPIDEMIA, UNSPECIFIED HYPERLIPIDEMIA TYPE: ICD-10-CM

## 2025-05-19 DIAGNOSIS — E55.9 VITAMIN D DEFICIENCY: ICD-10-CM

## 2025-05-19 DIAGNOSIS — E03.9 HYPOTHYROIDISM, UNSPECIFIED TYPE: ICD-10-CM

## 2025-05-19 DIAGNOSIS — E66.812 CLASS 2 SEVERE OBESITY DUE TO EXCESS CALORIES WITH SERIOUS COMORBIDITY AND BODY MASS INDEX (BMI) OF 39.0 TO 39.9 IN ADULT (HCC): Primary | ICD-10-CM

## 2025-05-19 DIAGNOSIS — R73.03 PRE-DIABETES: ICD-10-CM

## 2025-05-19 PROCEDURE — 99204 OFFICE O/P NEW MOD 45 MIN: CPT | Performed by: INTERNAL MEDICINE

## 2025-05-19 NOTE — ASSESSMENT & PLAN NOTE
Class 2 obesity with BMI 39.8 kg/m2     -  Main drivers of patients elevated body weight -  genetics, excess calories for patients basal metabolic rate, suboptimal Physical activity, fatigue, reduced sleep duration , poor sleep quality, circadian disruption, and Stress    Antiobesity Medications/Medical --     Requested patient to check with insurance regarding coverage for GLP-1 medication such as Wegovy or Zepbound  Will be checking labs to evaluate for comorbidities associated with weight   Willing to consider cashpay for Zepbound vial if needed if lack of insurance coverage  Metformin in case she does not want to go with the injectable option  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  May need workup for iron deficiency anemia.      Nutrition:    Will be working with a dietitian 3 visit RD bundle to help balance nutrition and enhance her protein intake    Physical Activity:   Continue walking with her daughter and at home workouts using kettle bell    Sleep: -  Patient likely has a degree of circadian disruption due to shiftwork which may be negatively impacting her weight   Discussed timing/anchoring main meals before or after her shift and focusing on smaller protein and fiber rich foods during her shift to keep eating patterns consistent is much as possible  Currently getting only 4 hours of poor quality sleep during the day.  Discussed extending sleep by at least 1 to 2 hours sleep deprivation (less than 6 hours of sleep  causes alterations in hunger and satiety hormones leading to increased caloric intake especially for energy dense foods high carbohydrate and fatty foods)         Food Behaviors/Stress/pyschosocial:   Patient's main food related behaviors are :  occasional stress eating which seems insignificant and could be driven by inadequate sleep

## 2025-05-19 NOTE — PROGRESS NOTES
Assessment/Plan     Teetee Mahoney is 30 y.o. year old female  who comes in for consultation for assistance with weight management.     - Discussed options of [facility]-Intensive Lifestyle Intervention Program, Very Low Calorie Diet-VLCD, and Conservative Program and the role of weight loss medications.  - Patient is interested in pursuing Conservative Program        Class 2 severe obesity due to excess calories with serious comorbidity and body mass index (BMI) of 39.0 to 39.9 in adult (HCC)  Class 2 obesity with BMI 39.8 kg/m2     Antiobesity Medications/Medical --     Requested patient to check with insurance regarding coverage for GLP-1 medication such as Wegovy or Zepbound  Will be checking labs to evaluate for comorbidities associated with weight   Willing to consider cashpay for Zepbound vial if needed if lack of insurance coverage  Metformin in case she does not want to go with the injectable option  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  May need workup for iron deficiency anemia.      Nutrition:    Will be working with a dietitian 3 visit RD bundle to help balance nutrition and enhance her protein intake    Physical Activity:   Continue walking with her daughter and at home workouts using kettle bell    Sleep: -  Patient likely has a degree of circadian disruption due to shiftwork which may be negatively impacting her weight   Discussed timing/anchoring main meals before or after her shift and focusing on smaller protein and fiber rich foods during her shift to keep eating patterns consistent is much as possible  Currently getting only 4 hours of poor quality sleep during the day.  Discussed extending sleep by at least 1 to 2 hours sleep deprivation (less than 6 hours of sleep  causes alterations in hunger and satiety hormones leading to increased  caloric intake especially for energy dense foods high carbohydrate and fatty foods)         Food Behaviors/Stress/pyschosocial:   Patient's main food related behaviors are :  occasional stress eating which seems insignificant and could be driven by     Teetee was seen today for consult.    Diagnoses and all orders for this visit:    Class 2 severe obesity due to excess calories with serious comorbidity and body mass index (BMI) of 39.0 to 39.9 in adult (HCC)  -     CBC and differential; Future  -     Comprehensive metabolic panel; Future    Pre-diabetes  -     Hemoglobin A1C; Future  -     Insulin, fasting; Future    Hyperlipidemia, unspecified hyperlipidemia type  -     Lipid panel; Future    Hypothyroidism, unspecified type  -     T3, free; Future    Vitamin D deficiency  -     Vitamin D 25 hydroxy; Future          -In addition, please follow general recommendations below.          Return visit:  6-8 weeks        General Lifestyle recommendations:    Nutrition   -Avoid skipping meals. Avoid sugary beverages. At least 64oz of water daily.  Limit processed food, refined sugars and grain. Encourage  healthy choices for meals and snacks   -Focus on protein goals and non starchy fiber rich vegetables for satiety effect and to help support a calorie deficit.   - Emphasize portion control, well balanced macronutrient's (protein, carbohydrate, fat using MyPlate method )and adequate protein with each meal/snacks and distributing calories equally throughout the day along with.   -Advise starting the day with a protein breakfast   Behavioral/Stress   Food log via audrey or provided paper log (audrey options include [email or web address], sparkpeople.com, loseit.com, calorieking.com, [employer]). Encouraged mindful eating. Be sure to set aside time to eat, eat slowly, and savor your food. Consider meditation apps and/or taking a few minutes of mindfulness every AM. Understand the role of regarding the role of stress hormone  "cortisol in promoting weight gain and visceral fat accumulation. Weigh daily or atleast 2-3 times/ week  Physical Activity   Increase physical activity by 10 minutes daily. Gradually increase physical activity to a goal of 5 days per week for 30 minutes of MODERATE intensity ( should be able to pass the \"talk test\" but should not be able to sing. Target 150-300 minutes  PLUS 2 days per week of FULL BODY resistance training. Progression will be addressed at follow up visits. Encouraged contemplation regarding establishing a daily physical activity routine  - Resistance training along with increase protein intake is important to maintain and enhance metabolism  Sleep   Encourage sleep hygiene and importance of having adequate sleep duration at least > 6 hours to support response in weight loss efforts    Handouts provided :  THRIVE program at [facility]  MyPlate and food quality  Food log resources, phone audrey or paper journal  Antiobesity medications options     - Discussed at length and the role of weight loss medications and medication options   - Explained the importance of making lifestyle changes in addition to starting any anti-obesity medications if the  patient chooses.  -  Initial weight loss goal of 5-10% weight loss for improved health  - Weight loss can improve patient's co-morbid conditions and/or prevent weight-related complications.  - Weight is not at goal and patient has been unable to achieve a meaningful weight loss above 5% using various programs and tools for more than 6 months    Teetee was seen today for consult.    Diagnoses and all orders for this visit:    Class 2 severe obesity due to excess calories with serious comorbidity and body mass index (BMI) of 39.0 to 39.9 in adult (HCC)  -     CBC and differential; Future  -     Comprehensive metabolic panel; Future    Pre-diabetes  -     Hemoglobin A1C; Future  -     Insulin, fasting; Future    Hyperlipidemia, unspecified hyperlipidemia " type  -     Lipid panel; Future    Hypothyroidism, unspecified type  -     T3, free; Future    Vitamin D deficiency  -     Vitamin D 25 hydroxy; Future                      Total time spent reviewing chart, interviewing patient, examining patient, discussing plan, answering all questions, and documentin min, with >50% face-to-face time spent counseling patient on nonsurgical interventions for the treatment of excess weight. Discussed in detail nonsurgical options including intensive lifestyle intervention program, very low-calorie diet program and conservative program.  Discussed the role of weight loss medications.  Counseled patient on diet behavior and exercise modification for weight loss.        History of present illness/ Weight history   Patient reports that she has struggled with her weight most of her life.  Most recently she has struggled to to lose weight after the birth of her daughter 2 years ago.  She reports that during pregnancy she was very sick and did not gain much weight but had rebound 50 pound weight gain after the birth of her daughter.  She states that any of her lifestyle efforts such as working out or following a meal plan have only led to modest results.  She states that her friend is a  and has provided her with some kettle bell workouts and her father is a  and provides her with a meal plan.She feels short of breath even walking up the stairs with her 2-year-old daughter.  She feels that her working night shifts 6 nights a week has been a major contributor to her weight gain despite her healthy eating habits.  She does report a family history of obesity in her mother.  She is referred by her friend who is a patient of medical weight management        Wt Readings from Last 30 Encounters:   25 107 kg (236 lb 3.2 oz)   24 103 kg (227 lb 9.6 oz)   24 104 kg (228 lb 6.4 oz)   23 90.7 kg (200 lb)   23 94.8 kg (209 lb)   23 96.1 kg  (211 lb 12.8 oz)   03/02/23 95.7 kg (211 lb)   02/22/23 94.3 kg (208 lb)   02/15/23 94.7 kg (208 lb 12.8 oz)   01/31/23 91.6 kg (202 lb)   01/24/23 90.4 kg (199 lb 6.4 oz)   01/17/23 89.4 kg (197 lb)   01/03/23 88 kg (194 lb)   12/20/22 88.9 kg (196 lb)   11/29/22 88.5 kg (195 lb)   11/29/22 88 kg (194 lb)   11/15/22 88.4 kg (194 lb 14.2 oz)   11/02/22 87.3 kg (192 lb 6.4 oz)   09/23/22 88.8 kg (195 lb 12.8 oz)   09/23/22 89.4 kg (197 lb)   09/09/22 88.5 kg (195 lb)   09/07/22 88.9 kg (196 lb)   08/11/22 94.6 kg (208 lb 9.6 oz)   08/08/22 95.3 kg (210 lb)   05/31/22 92.1 kg (203 lb)   05/24/22 92.1 kg (203 lb)   04/27/22 92.2 kg (203 lb 3.2 oz)   08/30/21 93 kg (205 lb)   06/29/21 93 kg (205 lb)   04/30/21 93.3 kg (205 lb 11 oz)    BMI Readings from Last 30 Encounters:   05/19/25 39.83 kg/m²   06/26/24 37.87 kg/m²   05/13/24 38.01 kg/m²   04/27/23 33.28 kg/m²   03/13/23 34.78 kg/m²   03/08/23 35.25 kg/m²   03/02/23 35.11 kg/m²   02/22/23 34.61 kg/m²   02/15/23 34.75 kg/m²   01/31/23 33.61 kg/m²   01/24/23 33.18 kg/m²   01/17/23 32.78 kg/m²   01/03/23 32.28 kg/m²   12/20/22 32.62 kg/m²   11/29/22 32.45 kg/m²   11/29/22 32.28 kg/m²   11/15/22 32.43 kg/m²   11/02/22 32.02 kg/m²   09/23/22 32.58 kg/m²   09/23/22 32.78 kg/m²   09/09/22 32.45 kg/m²   09/07/22 32.62 kg/m²   08/11/22 34.71 kg/m²   08/08/22 34.95 kg/m²   05/31/22 33.78 kg/m²   05/24/22 33.78 kg/m²   04/27/22 33.81 kg/m²   08/30/21 34.11 kg/m²   06/29/21 34.11 kg/m²   04/30/21 34.23 kg/m²                   Lifestyle questionnaire       Diet recall:  B: yogurt and granola + babnana avacaodo toast   S:  L: Chicken broccoli and rice  S:fruit  D: ground beef and rice  S: yogurt 10 PM  4 AM     Frequency Eating out x/ week:  1 x/week    Food behaviors---- occasional stress eating     Trouble area of the day: during the day     Beverages  Water-- 1 gallon   Caffeine/tea--  none   SSB -- none    Alcohol: no drinks/ week   Smoking: no  Drug use: no    Social History      Substance and Sexual Activity   Alcohol Use Not Currently      Tobacco Use History[1]   Social History     Substance and Sexual Activity   Drug Use No         Physical Activity --walks  , at home work outs KB with friend      Sleep -- Stop bang: Score: 1 / 8  ; hours of sleep per day 6 pm -10 PM and goes to work at 11:15 PM     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  Obesity Class: 35.0-39.9- Obesity Class II  Goal weight: 170 lbs    Waist circumference (inches): 41 Inches    Colonoscopy: Not on file   Mammogram: Not on file    Medication considerations/contraindications     -Patient denies personal history of pancreatitis. Patient also denies personal and family history of medullary thyroid cancer, and multiple endocrine neoplasia type 2 (MEN 2 tumor). -Patient denies any history of kidney stones, seizures, or glaucoma, diabetic retinopathy, gall bladder disease, gastroparesis, hyperthyroidism.  -Denies Hx of CAD, PAD, palpitations, arrhythmia, uncontrolled hypertension  -Denies uncontrolled anxiety or depression, suicidal behavior or thinking , insomnia or sleep disturbance.         Past medical history/past surgical history       Previous notes and records have been reviewed.    The following portions of the patient's history were reviewed and updated as appropriate: allergies, current medications, past family history, past medical history, past social history, past surgical history, and problem list.    Past Medical History:   Diagnosis Date    Anxiety     Last assessed 5/18/2017     Bursitis     Depression     Last assessed 5/18/2017     Fatigue     Last assessed 5/18/2017     Obesity (BMI 30-39.9) 11/02/2022     "Refused influenza vaccine 02/18/2020    Varicella     Vaccinated         Past Surgical History:   Procedure Laterality Date    RHINOPLASTY      deviated septum    WISDOM TOOTH EXTRACTION               Family History   Problem Relation Age of Onset    Diabetes Mother         type 2 diabetes    No Known Problems Father     No Known Problems Brother     Heart defect Brother             Objective     /78 (BP Location: Left arm, Patient Position: Sitting, Cuff Size: Large)   Pulse 80   Resp 16   Ht 5' 4.57\" (1.64 m)   Wt 107 kg (236 lb 3.2 oz)   BMI 39.83 kg/m²     Review of Systems   Constitutional:  Negative for chills, fatigue and fever.   HENT:  Negative for ear pain and sore throat.    Eyes:  Negative for pain and visual disturbance.   Respiratory:  Negative for cough and shortness of breath.    Cardiovascular:  Negative for chest pain, palpitations and leg swelling.   Gastrointestinal:  Negative for abdominal pain, constipation, diarrhea, nausea and vomiting.   Genitourinary:  Negative for dysuria and hematuria.   Musculoskeletal:  Negative for arthralgias and back pain.   Skin:  Negative for color change and rash.   Neurological:  Negative for seizures, syncope and headaches.   Psychiatric/Behavioral:  Negative for dysphoric mood. The patient is not nervous/anxious.    All other systems reviewed and are negative.    Physical Exam  Vitals and nursing note reviewed.   Constitutional:       Appearance: Normal appearance.   HENT:      Head: Normocephalic.   Pulmonary:      Effort: Pulmonary effort is normal.   Neurological:      General: No focal deficit present.      Mental Status: He is alert and oriented to person, place, and time.   Psychiatric:         Mood and Affect: Mood normal.         Behavior: Behavior normal.         Thought Content: Thought content normal.         Judgment: Judgment normal.         Medications     Current Medications[2]           Labs and imaging     Recent labs and imaging " "have been personally reviewed.  Lab Results   Component Value Date    WBC 11.95 (H) 03/13/2023    HGB 10.2 (L) 03/13/2023    HCT 32.0 (L) 03/13/2023    MCV 76 (L) 03/13/2023     03/13/2023     Lab Results   Component Value Date    SODIUM 134 (L) 01/03/2023    K 3.5 01/03/2023     01/03/2023    CO2 22 01/03/2023    AGAP 8 01/03/2023    BUN 4 (L) 01/03/2023    CREATININE 0.28 (L) 01/03/2023    GLUC 84 01/03/2023    GLUF 82 10/14/2022    CALCIUM 8.5 01/03/2023    AST 18 01/03/2023    ALT 11 06/25/2024    ALKPHOS 108 (H) 01/03/2023    TP 6.6 01/03/2023    TBILI 0.33 01/03/2023    EGFR 163 01/03/2023     No results found for: \"HGBA1C\"  Lab Results   Component Value Date    EJT1IJUJEGUM 2.260 07/28/2017     No results found for: \"CHOLESTEROL\"  No results found for: \"HDL\"  No results found for: \"TRIG\"  No results found for: \"LDLCALC\"  No results found for: \"GLBY95QZIJYY\", \"LABINSU\"                       [1]   Social History  Tobacco Use   Smoking Status Never   Smokeless Tobacco Never   [2]   Current Outpatient Medications:     acetaminophen (TYLENOL) 325 mg tablet, Take 2 tablets (650 mg total) by mouth every 4 (four) hours as needed for mild pain (Patient not taking: Reported on 5/19/2025), Disp: , Rfl: 0    benzonatate (TESSALON) 200 MG capsule, Take 1 capsule (200 mg total) by mouth 3 (three) times a day as needed for cough (Patient not taking: Reported on 6/26/2024), Disp: 20 capsule, Rfl: 0    brompheniramine-pseudoephedrine-DM 30-2-10 MG/5ML syrup, Take 5 mL by mouth 4 (four) times a day as needed for cough (Patient not taking: Reported on 6/26/2024), Disp: 120 mL, Rfl: 0    ferrous sulfate 324 (65 Fe) mg, Take 1 tablet (324 mg total) by mouth every other day, Disp: 45 tablet, Rfl: 0    fluticasone (FLONASE) 50 mcg/act nasal spray, 1 spray into each nostril daily (Patient not taking: Reported on 6/26/2024), Disp: 9.9 mL, Rfl: 0    norethindrone-ethinyl estradiol (Junel FE 1/20) 1-20 MG-MCG per tablet, " Take 1 tablet by mouth daily (Patient not taking: Reported on 2025), Disp: 84 tablet, Rfl: 4    Prenatal Multivit-Min-Fe-FA (PRE- FORMULA PO), Take by mouth (Patient not taking: Reported on 2024), Disp: , Rfl:     witch hazel-glycerin (TUCKS) topical pad, Apply 1 pad. topically every 4 (four) hours as needed for irritation (Patient not taking: Reported on 2023), Disp: , Rfl: 0

## 2025-06-02 ENCOUNTER — TELEPHONE (OUTPATIENT)
Dept: BARIATRICS | Facility: CLINIC | Age: 30
End: 2025-06-02

## 2025-08-10 ENCOUNTER — OFFICE VISIT (OUTPATIENT)
Dept: URGENT CARE | Age: 30
End: 2025-08-10
Payer: COMMERCIAL